# Patient Record
Sex: FEMALE | Race: WHITE | NOT HISPANIC OR LATINO | Employment: OTHER | URBAN - METROPOLITAN AREA
[De-identification: names, ages, dates, MRNs, and addresses within clinical notes are randomized per-mention and may not be internally consistent; named-entity substitution may affect disease eponyms.]

---

## 2023-09-13 LAB — BMD RECOMMENDATION EXT: NORMAL

## 2024-01-18 ENCOUNTER — HOSPITAL ENCOUNTER (EMERGENCY)
Facility: HOSPITAL | Age: 79
Discharge: HOME | End: 2024-01-19
Attending: EMERGENCY MEDICINE
Payer: MEDICARE

## 2024-01-18 VITALS
OXYGEN SATURATION: 95 % | WEIGHT: 130 LBS | RESPIRATION RATE: 18 BRPM | BODY MASS INDEX: 22.2 KG/M2 | SYSTOLIC BLOOD PRESSURE: 158 MMHG | HEIGHT: 64 IN | DIASTOLIC BLOOD PRESSURE: 94 MMHG | HEART RATE: 86 BPM | TEMPERATURE: 98 F

## 2024-01-18 DIAGNOSIS — T78.40XA ALLERGIC REACTION, INITIAL ENCOUNTER: Primary | ICD-10-CM

## 2024-01-18 LAB
ALBUMIN SERPL BCP-MCNC: 3.2 G/DL (ref 3.5–5.2)
ALP SERPL-CCNC: 77 U/L (ref 55–135)
ALT SERPL W/O P-5'-P-CCNC: 7 U/L (ref 10–44)
ANION GAP SERPL CALC-SCNC: 13 MMOL/L (ref 8–16)
AST SERPL-CCNC: 14 U/L (ref 10–40)
BASOPHILS # BLD AUTO: 0.01 K/UL (ref 0–0.2)
BASOPHILS NFR BLD: 0.1 % (ref 0–1.9)
BILIRUB SERPL-MCNC: 0.2 MG/DL (ref 0.1–1)
BNP SERPL-MCNC: 76 PG/ML (ref 0–99)
BUN SERPL-MCNC: 20 MG/DL (ref 8–23)
CALCIUM SERPL-MCNC: 9.9 MG/DL (ref 8.7–10.5)
CHLORIDE SERPL-SCNC: 101 MMOL/L (ref 95–110)
CO2 SERPL-SCNC: 24 MMOL/L (ref 23–29)
CREAT SERPL-MCNC: 0.8 MG/DL (ref 0.5–1.4)
DIFFERENTIAL METHOD BLD: ABNORMAL
EOSINOPHIL # BLD AUTO: 0.2 K/UL (ref 0–0.5)
EOSINOPHIL NFR BLD: 2.2 % (ref 0–8)
ERYTHROCYTE [DISTWIDTH] IN BLOOD BY AUTOMATED COUNT: 15.2 % (ref 11.5–14.5)
EST. GFR  (NO RACE VARIABLE): >60 ML/MIN/1.73 M^2
GLUCOSE SERPL-MCNC: 105 MG/DL (ref 70–110)
HCT VFR BLD AUTO: 38.4 % (ref 37–48.5)
HGB BLD-MCNC: 12 G/DL (ref 12–16)
IMM GRANULOCYTES # BLD AUTO: 0.03 K/UL (ref 0–0.04)
IMM GRANULOCYTES NFR BLD AUTO: 0.4 % (ref 0–0.5)
INFLUENZA A, MOLECULAR: NEGATIVE
INFLUENZA B, MOLECULAR: NEGATIVE
LYMPHOCYTES # BLD AUTO: 1 K/UL (ref 1–4.8)
LYMPHOCYTES NFR BLD: 14.6 % (ref 18–48)
MAGNESIUM SERPL-MCNC: 2.1 MG/DL (ref 1.6–2.6)
MCH RBC QN AUTO: 28.1 PG (ref 27–31)
MCHC RBC AUTO-ENTMCNC: 31.3 G/DL (ref 32–36)
MCV RBC AUTO: 90 FL (ref 82–98)
MONOCYTES # BLD AUTO: 0.4 K/UL (ref 0.3–1)
MONOCYTES NFR BLD: 6.6 % (ref 4–15)
NEUTROPHILS # BLD AUTO: 5.1 K/UL (ref 1.8–7.7)
NEUTROPHILS NFR BLD: 76.1 % (ref 38–73)
NRBC BLD-RTO: 0 /100 WBC
PLATELET # BLD AUTO: 430 K/UL (ref 150–450)
PMV BLD AUTO: 9.9 FL (ref 9.2–12.9)
POTASSIUM SERPL-SCNC: 4.1 MMOL/L (ref 3.5–5.1)
PROT SERPL-MCNC: 8.2 G/DL (ref 6–8.4)
RBC # BLD AUTO: 4.27 M/UL (ref 4–5.4)
SARS-COV-2 RDRP RESP QL NAA+PROBE: NEGATIVE
SODIUM SERPL-SCNC: 138 MMOL/L (ref 136–145)
SPECIMEN SOURCE: NORMAL
WBC # BLD AUTO: 6.7 K/UL (ref 3.9–12.7)

## 2024-01-18 PROCEDURE — 71045 X-RAY EXAM CHEST 1 VIEW: CPT | Mod: TC

## 2024-01-18 PROCEDURE — 25000242 PHARM REV CODE 250 ALT 637 W/ HCPCS: Performed by: EMERGENCY MEDICINE

## 2024-01-18 PROCEDURE — 27000221 HC OXYGEN, UP TO 24 HOURS

## 2024-01-18 PROCEDURE — 94640 AIRWAY INHALATION TREATMENT: CPT

## 2024-01-18 PROCEDURE — 25000003 PHARM REV CODE 250: Performed by: EMERGENCY MEDICINE

## 2024-01-18 PROCEDURE — 63600175 PHARM REV CODE 636 W HCPCS: Performed by: EMERGENCY MEDICINE

## 2024-01-18 PROCEDURE — 83880 ASSAY OF NATRIURETIC PEPTIDE: CPT | Performed by: EMERGENCY MEDICINE

## 2024-01-18 PROCEDURE — 87502 INFLUENZA DNA AMP PROBE: CPT | Performed by: EMERGENCY MEDICINE

## 2024-01-18 PROCEDURE — 80053 COMPREHEN METABOLIC PANEL: CPT | Performed by: EMERGENCY MEDICINE

## 2024-01-18 PROCEDURE — 94761 N-INVAS EAR/PLS OXIMETRY MLT: CPT

## 2024-01-18 PROCEDURE — 83735 ASSAY OF MAGNESIUM: CPT | Performed by: EMERGENCY MEDICINE

## 2024-01-18 PROCEDURE — 71045 X-RAY EXAM CHEST 1 VIEW: CPT | Mod: 26,,, | Performed by: RADIOLOGY

## 2024-01-18 PROCEDURE — 96374 THER/PROPH/DIAG INJ IV PUSH: CPT

## 2024-01-18 PROCEDURE — U0002 COVID-19 LAB TEST NON-CDC: HCPCS | Performed by: EMERGENCY MEDICINE

## 2024-01-18 PROCEDURE — 96375 TX/PRO/DX INJ NEW DRUG ADDON: CPT

## 2024-01-18 PROCEDURE — 99284 EMERGENCY DEPT VISIT MOD MDM: CPT | Mod: 25

## 2024-01-18 PROCEDURE — 85025 COMPLETE CBC W/AUTO DIFF WBC: CPT | Performed by: EMERGENCY MEDICINE

## 2024-01-18 RX ORDER — IPRATROPIUM BROMIDE AND ALBUTEROL SULFATE 2.5; .5 MG/3ML; MG/3ML
3 SOLUTION RESPIRATORY (INHALATION)
Status: COMPLETED | OUTPATIENT
Start: 2024-01-18 | End: 2024-01-18

## 2024-01-18 RX ORDER — PREDNISONE 20 MG/1
60 TABLET ORAL DAILY
Qty: 15 TABLET | Refills: 0 | Status: SHIPPED | OUTPATIENT
Start: 2024-01-18 | End: 2024-01-23

## 2024-01-18 RX ORDER — KETOROLAC TROMETHAMINE 30 MG/ML
15 INJECTION, SOLUTION INTRAMUSCULAR; INTRAVENOUS
Status: COMPLETED | OUTPATIENT
Start: 2024-01-18 | End: 2024-01-18

## 2024-01-18 RX ORDER — TRAMADOL HYDROCHLORIDE 50 MG/1
50 TABLET ORAL
Status: COMPLETED | OUTPATIENT
Start: 2024-01-18 | End: 2024-01-18

## 2024-01-18 RX ORDER — ALBUTEROL SULFATE 90 UG/1
1-2 AEROSOL, METERED RESPIRATORY (INHALATION) EVERY 4 HOURS PRN
Qty: 8 G | Refills: 0 | Status: SHIPPED | OUTPATIENT
Start: 2024-01-18 | End: 2024-02-25

## 2024-01-18 RX ORDER — DEXAMETHASONE SODIUM PHOSPHATE 10 MG/ML
10 INJECTION INTRAMUSCULAR; INTRAVENOUS
Status: COMPLETED | OUTPATIENT
Start: 2024-01-18 | End: 2024-01-18

## 2024-01-18 RX ADMIN — IPRATROPIUM BROMIDE AND ALBUTEROL SULFATE 3 ML: .5; 2.5 SOLUTION RESPIRATORY (INHALATION) at 08:01

## 2024-01-18 RX ADMIN — KETOROLAC TROMETHAMINE 15 MG: 30 INJECTION, SOLUTION INTRAMUSCULAR; INTRAVENOUS at 09:01

## 2024-01-18 RX ADMIN — DEXAMETHASONE SODIUM PHOSPHATE 10 MG: 10 INJECTION INTRAMUSCULAR; INTRAVENOUS at 07:01

## 2024-01-18 RX ADMIN — TRAMADOL HYDROCHLORIDE 50 MG: 50 TABLET, COATED ORAL at 09:01

## 2024-01-19 NOTE — ED NOTES
Pt noted to have redness to grace upper and lower extremities, abd, and upper back areas. Pt reports itching and redness.    VTAMA Counseling: I discussed with the patient that VTAMA is not for use in the eyes, mouth or mouth. They should call the office if they develop any signs of allergic reactions to VTAMA. The patient verbalized understanding of the proper use and possible adverse effects of VTAMA.  All of the patient's questions and concerns were addressed.

## 2024-01-19 NOTE — ED PROVIDER NOTES
Encounter Date: 1/18/2024       History     Chief Complaint   Patient presents with    Allergic Reaction    Itching     EMS reports pt was complaining of itching and redness to skin. EMS reports that pt had taken a Benadryl prior to their arrival. Pt refused IV access for first responders and for EMS. Pt denies taking any new meds. Pt reports itching and redness to underarms also.      Pt with hx of RA here via EMS for eval itchy rash onset today. No new meds or known exposures. No SOB but EMS says pt's RA sats in the 80s. 95% on 2L. Pt denies hx of COPD or CHF. She has not had any recent illness nor taken any abx the past few weeks. She took benadryl pta and her itching has resolved. No pain. She says her BP was elevated when EMS checked it. She denies hx of HTN. She says she had RSV and shingles vaccines 2days ago.     The history is provided by the patient.   Allergic Reaction  The primary symptoms are  rash and urticaria. The primary symptoms do not include wheezing, shortness of breath, cough, abdominal pain, nausea, vomiting, diarrhea, dizziness, palpitations, altered mental status or angioedema. The current episode started today. The problem has not changed since onset.  The rash began today. The rash appears on the right arm, left arm, back, torso, face, groin and head. The rash is associated with itching. The rash is not associated with blisters or weeping.   Significant symptoms also include itching. Significant symptoms that are not present include eye redness, flushing or rhinorrhea.     Review of patient's allergies indicates:   Allergen Reactions    Pork/porcine containing products Hives    Beef containing products Rash     Past Medical History:   Diagnosis Date    Arthritis     Restless legs     Shingles      No past surgical history on file.  History reviewed. No pertinent family history.     Review of Systems   HENT:  Negative for rhinorrhea.    Eyes:  Negative for redness.   Respiratory:  Negative  for cough, shortness of breath and wheezing.    Cardiovascular:  Negative for palpitations.   Gastrointestinal:  Negative for abdominal pain, diarrhea, nausea and vomiting.   Skin:  Positive for itching and rash. Negative for flushing.   Neurological:  Negative for dizziness.   All other systems reviewed and are negative.      Physical Exam     Initial Vitals [01/18/24 1846]   BP Pulse Resp Temp SpO2   (!) 176/92 73 18 97.6 °F (36.4 °C) (!) 89 %      MAP       --         Physical Exam    Nursing note and vitals reviewed.  Constitutional: She appears well-developed and well-nourished. She is not diaphoretic. No distress.   HENT:   Head: Normocephalic and atraumatic.   Mouth/Throat: Oropharynx is clear and moist.   No mucosal lesions   Eyes: Conjunctivae and EOM are normal. Right eye exhibits no discharge. Left eye exhibits no discharge. No scleral icterus.   Neck: Neck supple. No thyromegaly present. No JVD present.   Normal range of motion.  Cardiovascular:  Normal rate, regular rhythm, normal heart sounds and intact distal pulses.           Pulmonary/Chest: She exhibits no tenderness.   Diminished throughout but clear   Abdominal: Abdomen is soft. There is no abdominal tenderness.   Musculoskeletal:         General: No tenderness or edema. Normal range of motion.      Cervical back: Normal range of motion and neck supple.      Comments: RA deformed fingers     Neurological: She is alert and oriented to person, place, and time. GCS score is 15. GCS eye subscore is 4. GCS verbal subscore is 5. GCS motor subscore is 6.   Skin: Skin is warm and dry. Capillary refill takes less than 2 seconds. Rash noted. No abscess noted. There is erythema. No pallor.   Urticarial rash to dorsum of hands, volar forearms, grace axillae, back, chest and groin. Spares palms and soles.    Psychiatric: She has a normal mood and affect.         ED Course   Procedures  Labs Reviewed   CBC W/ AUTO DIFFERENTIAL - Abnormal; Notable for the  following components:       Result Value    MCHC 31.3 (*)     RDW 15.2 (*)     Gran % 76.1 (*)     Lymph % 14.6 (*)     All other components within normal limits   COMPREHENSIVE METABOLIC PANEL - Abnormal; Notable for the following components:    Albumin 3.2 (*)     ALT 7 (*)     All other components within normal limits   INFLUENZA A & B BY MOLECULAR   MAGNESIUM   SARS-COV-2 RNA AMPLIFICATION, QUAL    Narrative:     Is the patient symptomatic?->No   B-TYPE NATRIURETIC PEPTIDE          Imaging Results              X-Ray Chest AP Portable (Final result)  Result time 01/18/24 19:23:25      Final result by Gaston Gonzalez MD (01/18/24 19:23:25)                   Impression:      No acute cardiopulmonary abnormality appreciated radiographically.      Electronically signed by: Med Gonzalez MD  Date:    01/18/2024  Time:    19:23               Narrative:    EXAMINATION:  XR CHEST AP PORTABLE    CLINICAL HISTORY:  hypoxia;    TECHNIQUE:  A single portable upright AP chest radiograph was acquired.    COMPARISON:  None    FINDINGS:  The cardiac silhouette is not enlarged.  The chest is partially obscured by bilateral breast implants.  There are probable chronic interstitial lung changes noted within the mid and lower lung zones.  No airspace consolidation or definite pulmonary mass appreciated on the provided images.  No significant volume of pleural fluid.  No pneumothorax.  The bones are osteopenic.  There is degenerative change of the AC joints.  There is degenerative change of the thoracic spine.                                       Medications   dexAMETHasone sodium phos (PF) injection 10 mg (10 mg Intravenous Given 1/18/24 1926)   albuterol-ipratropium 2.5 mg-0.5 mg/3 mL nebulizer solution 3 mL (3 mLs Nebulization Given 1/18/24 2028)   ketorolac injection 15 mg (15 mg Intravenous Given 1/18/24 2112)   traMADoL tablet 50 mg (50 mg Oral Given 1/18/24 2112)     Medical Decision Making  Pt presented with urticarial  rash of unknown etiology. She also had transient hypoxia but clear lungs on exam. She was given decadron and a neb. Her rash improved and her O2 requirement resolved. Pt was never sob. CBC, CMP,  Mg, BNP unremarkable. Covid and flu neg. Pt advised to make appt with her doctor in the next few days. Return precautions discussed.     Amount and/or Complexity of Data Reviewed  Labs: ordered. Decision-making details documented in ED Course.  Radiology: ordered. Decision-making details documented in ED Course.    Risk  Prescription drug management.               ED Course as of 01/18/24 2118   Thu Jan 18, 2024   2100 Rash improved. No longer requiring O2.  [DC]      ED Course User Index  [DC] Tomy Monae Jr., MD                           Clinical Impression:  Final diagnoses:  [T78.40XA] Allergic reaction, initial encounter (Primary)          ED Disposition Condition    Discharge Stable          ED Prescriptions       Medication Sig Dispense Start Date End Date Auth. Provider    predniSONE (DELTASONE) 20 MG tablet Take 3 tablets (60 mg total) by mouth once daily. for 5 days 15 tablet 1/18/2024 1/23/2024 Tomy Monae Jr., MD    albuterol (PROVENTIL/VENTOLIN HFA) 90 mcg/actuation inhaler Inhale 1-2 puffs into the lungs every 4 (four) hours as needed for Wheezing or Shortness of Breath. Rescue 8 g 1/18/2024 1/17/2025 Tomy Monae Jr., MD          Follow-up Information       Follow up With Specialties Details Why Contact Info    primary care clinic  Schedule an appointment as soon as possible for a visit                Tomy Monae Jr., MD  01/18/24 2118

## 2024-01-25 ENCOUNTER — OFFICE VISIT (OUTPATIENT)
Dept: FAMILY MEDICINE | Facility: CLINIC | Age: 79
End: 2024-01-25
Payer: MEDICARE

## 2024-01-25 ENCOUNTER — LAB VISIT (OUTPATIENT)
Dept: LAB | Facility: HOSPITAL | Age: 79
End: 2024-01-25
Attending: NURSE PRACTITIONER
Payer: MEDICARE

## 2024-01-25 VITALS
HEART RATE: 70 BPM | BODY MASS INDEX: 22.47 KG/M2 | OXYGEN SATURATION: 94 % | SYSTOLIC BLOOD PRESSURE: 140 MMHG | WEIGHT: 130.88 LBS | DIASTOLIC BLOOD PRESSURE: 80 MMHG

## 2024-01-25 DIAGNOSIS — Z00.00 ENCOUNTER FOR MEDICAL EXAMINATION TO ESTABLISH CARE: Primary | ICD-10-CM

## 2024-01-25 DIAGNOSIS — S40.212A ABRASION OF LEFT SHOULDER, INITIAL ENCOUNTER: ICD-10-CM

## 2024-01-25 DIAGNOSIS — Z13.220 ENCOUNTER FOR LIPID SCREENING FOR CARDIOVASCULAR DISEASE: ICD-10-CM

## 2024-01-25 DIAGNOSIS — Z13.6 ENCOUNTER FOR LIPID SCREENING FOR CARDIOVASCULAR DISEASE: ICD-10-CM

## 2024-01-25 DIAGNOSIS — F41.8 SITUATIONAL ANXIETY: ICD-10-CM

## 2024-01-25 DIAGNOSIS — E03.9 HYPOTHYROIDISM, UNSPECIFIED TYPE: ICD-10-CM

## 2024-01-25 DIAGNOSIS — Z86.2 HISTORY OF ANEMIA: ICD-10-CM

## 2024-01-25 DIAGNOSIS — Z12.11 SCREENING FOR COLON CANCER: ICD-10-CM

## 2024-01-25 DIAGNOSIS — R03.0 ELEVATED BLOOD PRESSURE READING IN OFFICE WITHOUT DIAGNOSIS OF HYPERTENSION: ICD-10-CM
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
## 2024-01-25 DIAGNOSIS — J84.9 CHRONIC INTERSTITIAL LUNG DISEASE: ICD-10-CM

## 2024-01-25 DIAGNOSIS — L50.0 URTICARIA DUE TO FOOD ALLERGY: ICD-10-CM

## 2024-01-25 DIAGNOSIS — Z23 NEED FOR VACCINATION: ICD-10-CM

## 2024-01-25 LAB
CHOLEST SERPL-MCNC: 172 MG/DL (ref 120–199)
CHOLEST/HDLC SERPL: 3.4 {RATIO} (ref 2–5)
HDLC SERPL-MCNC: 50 MG/DL (ref 40–75)
HDLC SERPL: 29.1 % (ref 20–50)
IRON SERPL-MCNC: 39 UG/DL (ref 30–160)
LDLC SERPL CALC-MCNC: 78.8 MG/DL (ref 63–159)
NONHDLC SERPL-MCNC: 122 MG/DL
SATURATED IRON: 9 % (ref 20–50)
T4 FREE SERPL-MCNC: 0.85 NG/DL (ref 0.71–1.51)
TOTAL IRON BINDING CAPACITY: 450 UG/DL (ref 250–450)
TRANSFERRIN SERPL-MCNC: 304 MG/DL (ref 200–375)
TRIGL SERPL-MCNC: 216 MG/DL (ref 30–150)
TSH SERPL DL<=0.005 MIU/L-ACNC: 4.82 UIU/ML (ref 0.4–4)

## 2024-01-25 PROCEDURE — 1160F RVW MEDS BY RX/DR IN RCRD: CPT | Mod: CPTII,S$GLB,, | Performed by: NURSE PRACTITIONER

## 2024-01-25 PROCEDURE — 83540 ASSAY OF IRON: CPT | Performed by: NURSE PRACTITIONER

## 2024-01-25 PROCEDURE — 80061 LIPID PANEL: CPT | Performed by: NURSE PRACTITIONER

## 2024-01-25 PROCEDURE — 84481 FREE ASSAY (FT-3): CPT | Performed by: NURSE PRACTITIONER

## 2024-01-25 PROCEDURE — 1101F PT FALLS ASSESS-DOCD LE1/YR: CPT | Mod: CPTII,S$GLB,, | Performed by: NURSE PRACTITIONER

## 2024-01-25 PROCEDURE — 1159F MED LIST DOCD IN RCRD: CPT | Mod: CPTII,S$GLB,, | Performed by: NURSE PRACTITIONER

## 2024-01-25 PROCEDURE — 90714 TD VACC NO PRESV 7 YRS+ IM: CPT | Mod: S$GLB,,, | Performed by: NURSE PRACTITIONER

## 2024-01-25 PROCEDURE — 36415 COLL VENOUS BLD VENIPUNCTURE: CPT | Performed by: NURSE PRACTITIONER

## 2024-01-25 PROCEDURE — 99204 OFFICE O/P NEW MOD 45 MIN: CPT | Mod: 25,S$GLB,, | Performed by: NURSE PRACTITIONER

## 2024-01-25 PROCEDURE — 1126F AMNT PAIN NOTED NONE PRSNT: CPT | Mod: CPTII,S$GLB,, | Performed by: NURSE PRACTITIONER

## 2024-01-25 PROCEDURE — 99999 PR PBB SHADOW E&M-EST. PATIENT-LVL III: CPT | Mod: PBBFAC,,, | Performed by: NURSE PRACTITIONER

## 2024-01-25 PROCEDURE — 3288F FALL RISK ASSESSMENT DOCD: CPT | Mod: CPTII,S$GLB,, | Performed by: NURSE PRACTITIONER

## 2024-01-25 PROCEDURE — 82728 ASSAY OF FERRITIN: CPT | Performed by: NURSE PRACTITIONER

## 2024-01-25 PROCEDURE — 84439 ASSAY OF FREE THYROXINE: CPT | Performed by: NURSE PRACTITIONER

## 2024-01-25 PROCEDURE — 3079F DIAST BP 80-89 MM HG: CPT | Mod: CPTII,S$GLB,, | Performed by: NURSE PRACTITIONER

## 2024-01-25 PROCEDURE — 3077F SYST BP >= 140 MM HG: CPT | Mod: CPTII,S$GLB,, | Performed by: NURSE PRACTITIONER

## 2024-01-25 PROCEDURE — 90471 IMMUNIZATION ADMIN: CPT | Mod: S$GLB,,, | Performed by: NURSE PRACTITIONER

## 2024-01-25 PROCEDURE — 84443 ASSAY THYROID STIM HORMONE: CPT | Performed by: NURSE PRACTITIONER

## 2024-01-25 RX ORDER — EPINEPHRINE 0.3 MG/.3ML
INJECTION SUBCUTANEOUS
COMMUNITY

## 2024-01-25 RX ORDER — ROPINIROLE 0.5 MG/1
0.5 TABLET, FILM COATED ORAL
COMMUNITY
End: 2024-03-11 | Stop reason: SDUPTHER

## 2024-01-25 RX ORDER — ALENDRONATE SODIUM 70 MG/1
70 TABLET ORAL
COMMUNITY
End: 2024-03-11 | Stop reason: SDUPTHER

## 2024-01-25 RX ORDER — LEVOTHYROXINE SODIUM 75 UG/1
75 TABLET ORAL
COMMUNITY
End: 2024-03-11 | Stop reason: SDUPTHER

## 2024-01-25 RX ORDER — EPINEPHRINE 0.3 MG/.3ML
1 INJECTION SUBCUTANEOUS ONCE
Qty: 0.3 ML | Refills: 0 | Status: SHIPPED | OUTPATIENT
Start: 2024-01-25 | End: 2024-01-25

## 2024-01-25 RX ORDER — ROSUVASTATIN CALCIUM 10 MG/1
10 TABLET, COATED ORAL DAILY
COMMUNITY
End: 2024-03-11 | Stop reason: SDUPTHER

## 2024-01-25 RX ORDER — AMOXICILLIN AND CLAVULANATE POTASSIUM 875; 125 MG/1; MG/1
1 TABLET, FILM COATED ORAL 2 TIMES DAILY
Qty: 10 TABLET | Refills: 0 | Status: SHIPPED | OUTPATIENT
Start: 2024-01-25 | End: 2024-01-30

## 2024-01-25 RX ORDER — MUPIROCIN 20 MG/G
OINTMENT TOPICAL 3 TIMES DAILY
Qty: 22 G | Refills: 0 | Status: SHIPPED | OUTPATIENT
Start: 2024-01-25

## 2024-01-25 RX ORDER — IBUPROFEN 800 MG/1
800 TABLET ORAL EVERY 8 HOURS
COMMUNITY
End: 2024-05-21 | Stop reason: SDUPTHER

## 2024-01-25 RX ORDER — PHENYTOIN SODIUM 100 MG/1
100 CAPSULE, EXTENDED RELEASE ORAL
COMMUNITY

## 2024-01-25 RX ORDER — HYDROXYZINE HYDROCHLORIDE 10 MG/1
10 TABLET, FILM COATED ORAL 3 TIMES DAILY PRN
Qty: 60 TABLET | Refills: 1 | Status: SHIPPED | OUTPATIENT
Start: 2024-01-25

## 2024-01-26 DIAGNOSIS — E03.8 SUBCLINICAL HYPOTHYROIDISM: Primary | ICD-10-CM

## 2024-01-26 DIAGNOSIS — D64.9 ANEMIA, UNSPECIFIED TYPE: ICD-10-CM

## 2024-01-26 LAB
FERRITIN SERPL-MCNC: 29 NG/ML (ref 20–300)
T3FREE SERPL-MCNC: 2.1 PG/ML (ref 2.3–4.2)

## 2024-01-26 RX ORDER — FERROUS SULFATE 325(65) MG
325 TABLET ORAL
Qty: 30 TABLET | Refills: 5 | Status: SHIPPED | OUTPATIENT
Start: 2024-01-26 | End: 2024-03-11 | Stop reason: SDUPTHER

## 2024-01-26 NOTE — PROGRESS NOTES
I have reviewed the notes, assessments, and/or procedures performed by Garima Gonzalez NP, I concur with her documentation of Anitra Fermin

## 2024-02-25 ENCOUNTER — NURSE TRIAGE (OUTPATIENT)
Dept: ADMINISTRATIVE | Facility: CLINIC | Age: 79
End: 2024-02-25
Payer: MEDICARE

## 2024-02-25 ENCOUNTER — HOSPITAL ENCOUNTER (EMERGENCY)
Facility: HOSPITAL | Age: 79
Discharge: HOME | End: 2024-02-25
Attending: EMERGENCY MEDICINE
Payer: MEDICARE

## 2024-02-25 VITALS
SYSTOLIC BLOOD PRESSURE: 197 MMHG | TEMPERATURE: 99 F | RESPIRATION RATE: 18 BRPM | OXYGEN SATURATION: 96 % | WEIGHT: 130 LBS | HEART RATE: 90 BPM | DIASTOLIC BLOOD PRESSURE: 84 MMHG | BODY MASS INDEX: 22.31 KG/M2

## 2024-02-25 DIAGNOSIS — T78.40XA ALLERGIC REACTION, INITIAL ENCOUNTER: ICD-10-CM

## 2024-02-25 DIAGNOSIS — J40 BRONCHITIS: Primary | ICD-10-CM

## 2024-02-25 LAB
GROUP A STREP, MOLECULAR: NEGATIVE
INFLUENZA A, MOLECULAR: NEGATIVE
INFLUENZA B, MOLECULAR: NEGATIVE
SARS-COV-2 RDRP RESP QL NAA+PROBE: NEGATIVE
SPECIMEN SOURCE: NORMAL

## 2024-02-25 PROCEDURE — U0002 COVID-19 LAB TEST NON-CDC: HCPCS | Performed by: NURSE PRACTITIONER

## 2024-02-25 PROCEDURE — 87651 STREP A DNA AMP PROBE: CPT | Performed by: NURSE PRACTITIONER

## 2024-02-25 PROCEDURE — 63600175 PHARM REV CODE 636 W HCPCS: Performed by: EMERGENCY MEDICINE

## 2024-02-25 PROCEDURE — 87502 INFLUENZA DNA AMP PROBE: CPT | Performed by: NURSE PRACTITIONER

## 2024-02-25 PROCEDURE — 99284 EMERGENCY DEPT VISIT MOD MDM: CPT

## 2024-02-25 RX ORDER — ALBUTEROL SULFATE 90 UG/1
1-2 AEROSOL, METERED RESPIRATORY (INHALATION) EVERY 4 HOURS PRN
Qty: 8 G | Refills: 0 | Status: SHIPPED | OUTPATIENT
Start: 2024-02-25 | End: 2025-02-24

## 2024-02-25 RX ORDER — AZITHROMYCIN 250 MG/1
TABLET, FILM COATED ORAL
Qty: 6 TABLET | Refills: 0 | Status: SHIPPED | OUTPATIENT
Start: 2024-02-25 | End: 2024-03-08 | Stop reason: ALTCHOICE

## 2024-02-25 RX ORDER — PREDNISONE 20 MG/1
20 TABLET ORAL DAILY
Qty: 5 TABLET | Refills: 0 | Status: SHIPPED | OUTPATIENT
Start: 2024-02-25 | End: 2024-03-08 | Stop reason: ALTCHOICE

## 2024-02-25 RX ORDER — PREDNISONE 10 MG/1
40 TABLET ORAL
Status: COMPLETED | OUTPATIENT
Start: 2024-02-25 | End: 2024-02-25

## 2024-02-25 RX ADMIN — PREDNISONE 40 MG: 10 TABLET ORAL at 10:02

## 2024-02-26 NOTE — ED PROVIDER NOTES
Encounter Date: 2024       History     Chief Complaint   Patient presents with    Sore Throat     Began today. Was unable to eat dinner because it hurts too bad     POV to ED alone.  Patient complains of cough with congestion, body aches, chills, and sore throat onset earlier today.  She denies fever or vomiting.  Unknown sick contacts.  No other complaints    The history is provided by the patient.     Review of patient's allergies indicates:   Allergen Reactions    Pork/porcine containing products Hives    Sheep/ovine/lamb containing products     Beef containing products Rash     Past Medical History:   Diagnosis Date    Arthritis     Restless legs     Shingles      History reviewed. No pertinent surgical history.  History reviewed. No pertinent family history.  Social History     Tobacco Use    Smoking status: Former     Current packs/day: 0.00     Types: Cigarettes     Quit date: 1/15/1996     Years since quittin.1    Smokeless tobacco: Never     Review of Systems   Constitutional:  Positive for chills and fatigue. Negative for fever.   HENT:  Positive for congestion, rhinorrhea and sore throat.    Respiratory:  Positive for cough. Negative for shortness of breath.    Gastrointestinal:  Negative for abdominal pain, diarrhea, nausea and vomiting.   All other systems reviewed and are negative.      Physical Exam     Initial Vitals [24]   BP Pulse Resp Temp SpO2   (!) 197/84 90 18 98.7 °F (37.1 °C) 96 %      MAP       --         Physical Exam    Nursing note and vitals reviewed.  Constitutional: She appears well-developed and well-nourished. No distress.   HENT:   Head: Normocephalic and atraumatic.   Right Ear: External ear normal.   Left Ear: External ear normal.   Mild erythema, postnasal drainage.  No swelling, no exudate   Eyes: Pupils are equal, round, and reactive to light.   Neck:   Normal range of motion.  Cardiovascular:  Normal rate and regular rhythm.           Pulmonary/Chest:  Breath sounds normal. No respiratory distress.   Abdominal: Abdomen is soft. There is no abdominal tenderness.   Musculoskeletal:         General: Normal range of motion.      Cervical back: Normal range of motion.     Neurological: She is alert and oriented to person, place, and time. She has normal strength. GCS score is 15. GCS eye subscore is 4. GCS verbal subscore is 5. GCS motor subscore is 6.   Skin: Skin is warm and dry. Capillary refill takes 2 to 3 seconds.   Psychiatric: She has a normal mood and affect. Thought content normal.         ED Course   Procedures  Labs Reviewed   GROUP A STREP, MOLECULAR   INFLUENZA A & B BY MOLECULAR   SARS-COV-2 RNA AMPLIFICATION, QUAL          Imaging Results    None          Medications - No data to display  Medical Decision Making  Presents for evaluation of sore throat with upper respiratory illness, lab work ordered.  Disposition pending  Differentials including but not limited to viral illness, bacterial illness, otitis, sinusitis, bronchitis, pneumonia  @ 2153 end of shift report to Dr Martinez                                      Clinical Impression:                 Ondina Gallo, DON  02/25/24 2133       Ondina Gallo, DON  02/25/24 2154

## 2024-02-26 NOTE — TELEPHONE ENCOUNTER
"[place] Pt:    Pt calls stating that both sides of her throat are infected "up into my ear." Pt states that it hurts to swallow. Symptoms started today. Pt notes some difficulty breathing. She states that she used to get similar symptoms "when I lived in [place] and they would give me a z-pack."     Care Advice recommends that pt be seen in ED now. Pt verbalizes understanding and is instructed to call back with any new/worsening sxs, questions, or concerns.   Reason for Disposition   [1] Difficulty breathing AND [2] not severe    Additional Information   Negative: SEVERE difficulty breathing (e.g., struggling for each breath, speaks in single words, stridor)   Negative: Sounds like a life-threatening emergency to the triager   Negative: [1] Drooling or spitting out saliva (because can't swallow) AND [2] normal breathing   Negative: Unable to open mouth completely    Protocols used: Sore Throat-A-AH    "

## 2024-02-26 NOTE — DISCHARGE INSTRUCTIONS
You likely have an upper respiratory infection.  Though your swabs were negative today sometimes it takes over 24 hours for those swabs to when tested become back positive.  So if you still feel unwell in the next 24-48 hours please seek repeat medical evaluation and they may need to be swab you for things like COVID or flu to recheck you.  Return to the emergency department with any new or worsening symptoms.

## 2024-02-28 DIAGNOSIS — Z78.0 MENOPAUSE: ICD-10-CM

## 2024-03-01 ENCOUNTER — TELEPHONE (OUTPATIENT)
Dept: FAMILY MEDICINE | Facility: CLINIC | Age: 79
End: 2024-03-01
Payer: MEDICARE

## 2024-03-01 NOTE — TELEPHONE ENCOUNTER
Spoke with patient she states that that she was seen in the ED and she is requesting a Z-DEQUAN. She has no relief of symptoms.    Seen in ED on 2/25/24 Prescribed Z-DEQUAN on 2/25/24 prescription end date is today.    E-visit offered she states she does not use the portal.

## 2024-03-01 NOTE — TELEPHONE ENCOUNTER
----- Message from Phyllis Casey sent at 3/1/2024 10:31 AM CST -----  Contact: Self  Type:  RX Refill Request    Who Called:  Self  Refill or New Rx:  refill  RX Name and Strength:  azithromycin (Z-DEQUAN) 250 MG table - she needs more after her hosp visit  How is the patient currently taking it? (ex. 1XDay):  Take 2 tablets by mouth on day 1; Take 1 tablet by mouth on days 2-5   Is this a 30 day or 90 day RX:  6 tablet 0   Preferred Pharmacy with phone number:    [facility] [address] [place] [address] [address] [address]  Phone: [phone] Fax: [phone]  Local or Mail Order:  Local  Ordering Provider:  Carlos De La Rosa Call Back Number:  [phone]  Additional Information:  Please call the patient back at the phone number listed above to advise. Thank you!

## 2024-03-04 ENCOUNTER — PATIENT MESSAGE (OUTPATIENT)
Dept: FAMILY MEDICINE | Facility: CLINIC | Age: 79
End: 2024-03-04
Payer: MEDICARE

## 2024-03-08 ENCOUNTER — OFFICE VISIT (OUTPATIENT)
Dept: FAMILY MEDICINE | Facility: CLINIC | Age: 79
End: 2024-03-08
Payer: MEDICARE

## 2024-03-08 VITALS
WEIGHT: 137.19 LBS | SYSTOLIC BLOOD PRESSURE: 128 MMHG | OXYGEN SATURATION: 97 % | DIASTOLIC BLOOD PRESSURE: 80 MMHG | HEART RATE: 67 BPM | BODY MASS INDEX: 23.55 KG/M2

## 2024-03-08 DIAGNOSIS — E03.9 HYPOTHYROIDISM, UNSPECIFIED TYPE: ICD-10-CM

## 2024-03-08 DIAGNOSIS — J84.9 CHRONIC INTERSTITIAL LUNG DISEASE: Primary | ICD-10-CM

## 2024-03-08 DIAGNOSIS — D64.9 ANEMIA, UNSPECIFIED TYPE: ICD-10-CM

## 2024-03-08 DIAGNOSIS — M81.0 AGE-RELATED OSTEOPOROSIS WITHOUT CURRENT PATHOLOGICAL FRACTURE: ICD-10-CM

## 2024-03-08 DIAGNOSIS — Z87.39 PERSONAL HISTORY OF RHEUMATOID ARTHRITIS: ICD-10-CM

## 2024-03-08 DIAGNOSIS — G25.81 RESTLESS LEGS SYNDROME: ICD-10-CM

## 2024-03-08 DIAGNOSIS — E78.2 MIXED HYPERLIPIDEMIA: ICD-10-CM

## 2024-03-08 DIAGNOSIS — F41.8 SITUATIONAL ANXIETY: ICD-10-CM

## 2024-03-08 PROCEDURE — 1160F RVW MEDS BY RX/DR IN RCRD: CPT | Mod: CPTII,S$GLB,, | Performed by: NURSE PRACTITIONER

## 2024-03-08 PROCEDURE — 99214 OFFICE O/P EST MOD 30 MIN: CPT | Mod: S$GLB,,, | Performed by: NURSE PRACTITIONER

## 2024-03-08 PROCEDURE — 99999 PR PBB SHADOW E&M-EST. PATIENT-LVL III: CPT | Mod: PBBFAC,,, | Performed by: NURSE PRACTITIONER

## 2024-03-08 PROCEDURE — 1159F MED LIST DOCD IN RCRD: CPT | Mod: CPTII,S$GLB,, | Performed by: NURSE PRACTITIONER

## 2024-03-08 PROCEDURE — 3079F DIAST BP 80-89 MM HG: CPT | Mod: CPTII,S$GLB,, | Performed by: NURSE PRACTITIONER

## 2024-03-08 PROCEDURE — 1101F PT FALLS ASSESS-DOCD LE1/YR: CPT | Mod: CPTII,S$GLB,, | Performed by: NURSE PRACTITIONER

## 2024-03-08 PROCEDURE — 1126F AMNT PAIN NOTED NONE PRSNT: CPT | Mod: CPTII,S$GLB,, | Performed by: NURSE PRACTITIONER

## 2024-03-08 PROCEDURE — 3288F FALL RISK ASSESSMENT DOCD: CPT | Mod: CPTII,S$GLB,, | Performed by: NURSE PRACTITIONER

## 2024-03-08 PROCEDURE — 3074F SYST BP LT 130 MM HG: CPT | Mod: CPTII,S$GLB,, | Performed by: NURSE PRACTITIONER

## 2024-03-08 RX ORDER — MONTELUKAST SODIUM 10 MG/1
10 TABLET ORAL NIGHTLY
Qty: 30 TABLET | Refills: 0 | Status: SHIPPED | OUTPATIENT
Start: 2024-03-08 | End: 2024-03-11 | Stop reason: SDUPTHER

## 2024-03-08 NOTE — PROGRESS NOTES
Subjective:       Patient ID: Anitra Fermin is a 78 y.o. female.    Chief Complaint: Follow-up    Anitra Fermin presents to the clinic today for follow up on Bronchitis  Recently moved from [facility] to [facility] to assist her sister who she reports is now on Hospice  Was under the care of PCP: Yaron Contreras  Patient Active Problem List  Diagnosis  · Age-related osteoporosis without current pathological fracture  · Major depression, single episode  · Mixed hyperlipidemia  · Peripheral vertigo  · Restless legs syndrome  · Hypothyroidism  · Chronic interstitial lung disease  · Personal history of rheumatoid arthritis  · Anemia  · Situational anxiety         She was seen in the ER 2/25/2024 for URI symptoms- she was treated for Bronchitis with a Zpack and prednisone taper.   Reports she has completed this medication, but continues to have a cough  Was provided an Albuterol inhaler, but is unable to use due to difficulty pressing this in  Reports history of RA and was followed by Rheumatology previously, but has not seen in sometime.  Reports was prescribed a medication- does not recall the name of this either  Would like a new referral to Rheumatology   Sister has a cat- reports increased itchy watery eyes, post nasal drainage since coming to [place]     Requesting refills on medications to be sent to Mail order  Unable to verify Dilantin prescription- historical records reviewed with no dx of seizure disorder noted. Denies history of seizures.        Review of Systems    Patient Active Problem List   Diagnosis    Age-related osteoporosis without current pathological fracture    Major depression, single episode    Mixed hyperlipidemia    Peripheral vertigo    Restless legs syndrome    Hypothyroidism    Chronic interstitial lung disease    Personal history of rheumatoid arthritis    Anemia    Situational anxiety       Objective:      Physical Exam  Constitutional:       General: She is not in acute distress.     Appearance:  Normal appearance. She is not ill-appearing.   HENT:      Right Ear: Tympanic membrane normal.      Left Ear: Tympanic membrane normal.      Nose: Rhinorrhea present. Rhinorrhea is clear.      Mouth/Throat:      Lips: Pink.      Pharynx: Oropharynx is clear. Uvula midline. No posterior oropharyngeal erythema or uvula swelling.   Cardiovascular:      Rate and Rhythm: Normal rate and regular rhythm.      Heart sounds: Normal heart sounds.   Pulmonary:      Effort: Pulmonary effort is normal. No respiratory distress.      Breath sounds: Normal breath sounds. No wheezing.   Musculoskeletal:      Right hand: Deformity present.      Left hand: Deformity present.   Lymphadenopathy:      Cervical: No cervical adenopathy.   Skin:     General: Skin is warm and dry.   Neurological:      Mental Status: She is alert and oriented to person, place, and time.   Psychiatric:         Mood and Affect: Mood normal.         Behavior: Behavior normal.         Lab Results   Component Value Date    WBC 6.70 01/18/2024    HGB 12.0 01/18/2024    HCT 38.4 01/18/2024     01/18/2024    CHOL 172 01/25/2024    TRIG 216 (H) 01/25/2024    HDL 50 01/25/2024    ALT 7 (L) 01/18/2024    AST 14 01/18/2024     01/18/2024    K 4.1 01/18/2024     01/18/2024    CREATININE 0.8 01/18/2024    BUN 20 01/18/2024    CO2 24 01/18/2024    TSH 4.824 (H) 01/25/2024     The 10-year ASCVD risk score (Shad PEREZ, et al., 2019) is: 21.6%    Values used to calculate the score:      Age: 78 years      Sex: Female      Is Non- : No      Diabetic: No      Tobacco smoker: No      Systolic Blood Pressure: 128 mmHg      Is BP treated: No      HDL Cholesterol: 50 mg/dL      Total Cholesterol: 172 mg/dL  Visit Vitals  /80 (BP Location: Left arm, Patient Position: Sitting, BP Method: Medium (Manual))   Pulse 67   Wt 62.2 kg (137 lb 3.2 oz)   SpO2 97%   BMI 23.55 kg/m²      Assessment:       1. Chronic interstitial lung disease    2.  Hypothyroidism, unspecified type    3. Age-related osteoporosis without current pathological fracture    4. Restless legs syndrome    5. Situational anxiety    6. Anemia, unspecified type    7. Mixed hyperlipidemia    8. Personal history of rheumatoid arthritis        Plan:       1. Chronic interstitial lung disease  -     montelukast (SINGULAIR) 10 mg tablet; Take 1 tablet (10 mg total) by mouth every evening.  Dispense: 30 tablet; Refill: 0  + Singulair nightly  Encouraged saline nasal flushes/rinses     2. Hypothyroidism, Subclinical  Historically was taking levothyroxine- has not had in a few months  Recheck labs  in 3 months     3. Age-related osteoporosis without current pathological fracture  -     alendronate (FOSAMAX) 70 MG tablet; Take 1 tablet (70 mg total) by mouth every 7 days.  Dispense: 12 tablet; Refill: 3  Continue current medication regimen   4. Restless legs syndrome  -     rOPINIRole (REQUIP) 0.5 MG tablet; Take 1 tablet (0.5 mg total) by mouth every evening.  Dispense: 90 tablet; Refill: 1  Continue current medication regimen   5. Situational anxiety  Hydroxyzine as needed- has utd prescription  6. Anemia, unspecified type  -     ferrous sulfate (FEOSOL) 325 mg (65 mg iron) Tab tablet; Take 1 tablet (325 mg total) by mouth daily with breakfast.  Dispense: 90 tablet; Refill: 1  Continue current medication regimen   7. Mixed hyperlipidemia  -     rosuvastatin (CRESTOR) 10 MG tablet; Take 1 tablet (10 mg total) by mouth once daily.  Dispense: 90 tablet; Refill: 3  Continue current medication regimen   8. Personal history of rheumatoid arthritis  -     Ambulatory referral/consult to Rheumatology; Future; Expected date: 03/15/2024  Rheumatology referral  Encouraged to reach out to mail order to request no child proof lids/ pill pack      Follow up in about 6 weeks (around 4/19/2024).  Dilantin refill not provided as chart review shows no appropriate diagnosis for medication, unsure if patient has  consistently been taking as she does not recall reason- denies seizure history.   Records request for further review.     Future Appointments       Date Provider Specialty Appt Notes    4/9/2024 Garima Gonzalez, DON Family Medicine Follow Up

## 2024-03-10 ENCOUNTER — NURSE TRIAGE (OUTPATIENT)
Dept: ADMINISTRATIVE | Facility: CLINIC | Age: 79
End: 2024-03-10
Payer: MEDICARE

## 2024-03-10 NOTE — TELEPHONE ENCOUNTER
Caller states that she was seen in office x 2 days ago. Caller states that prescription for Singulair was to be sent to [facility], caller states medication not at pharmacy. Pt EMR shows that medication was sent to mail order pharmacy. Caller is requesting that medication be sent to local pharmacy. Attempted to contact provider, facility not listed in on call finder. [facility]  contacted when office dial directly, transferred to number listed for facility per , directed back to [facility] on call. Caller inform that I was unable to contact on call provider. Suggested that caller contact Pharmacy directly to request transfer of prescription and message will be sent to office for further assistance.  Pt advised per protocol and verbalized understanding.     Reason for Disposition   [1] Prescription not at pharmacy AND [2] was prescribed by PCP recently  (Exception: Triager has access to EMR and prescription is recorded there. Go to Home Care and confirm for pharmacy.)    Protocols used: Medication Refill and Renewal Call-A-

## 2024-03-11 ENCOUNTER — TELEPHONE (OUTPATIENT)
Dept: FAMILY MEDICINE | Facility: CLINIC | Age: 79
End: 2024-03-11
Payer: MEDICARE

## 2024-03-11 DIAGNOSIS — J84.9 CHRONIC INTERSTITIAL LUNG DISEASE: ICD-10-CM

## 2024-03-11 PROBLEM — M81.0 AGE-RELATED OSTEOPOROSIS WITHOUT CURRENT PATHOLOGICAL FRACTURE: Status: ACTIVE | Noted: 2024-03-11

## 2024-03-11 PROBLEM — E78.2 MIXED HYPERLIPIDEMIA: Status: ACTIVE | Noted: 2024-03-11

## 2024-03-11 PROBLEM — E03.9 HYPOTHYROIDISM: Status: ACTIVE | Noted: 2024-03-11

## 2024-03-11 PROBLEM — G25.81 RESTLESS LEGS SYNDROME: Status: ACTIVE | Noted: 2024-03-11

## 2024-03-11 PROBLEM — F41.8 SITUATIONAL ANXIETY: Status: ACTIVE | Noted: 2024-03-11

## 2024-03-11 PROBLEM — D64.9 ANEMIA: Status: ACTIVE | Noted: 2024-03-11

## 2024-03-11 PROBLEM — H81.399 PERIPHERAL VERTIGO: Status: ACTIVE | Noted: 2024-03-11

## 2024-03-11 PROBLEM — Z87.39 PERSONAL HISTORY OF RHEUMATOID ARTHRITIS: Status: ACTIVE | Noted: 2024-03-11

## 2024-03-11 PROBLEM — F32.9 MAJOR DEPRESSION, SINGLE EPISODE: Status: ACTIVE | Noted: 2024-03-11

## 2024-03-11 RX ORDER — LEVOTHYROXINE SODIUM 75 UG/1
75 TABLET ORAL
Qty: 90 TABLET | Refills: 1 | Status: SHIPPED | OUTPATIENT
Start: 2024-03-11 | End: 2024-03-11 | Stop reason: CLARIF

## 2024-03-11 RX ORDER — ROPINIROLE 0.5 MG/1
0.5 TABLET, FILM COATED ORAL NIGHTLY
Qty: 90 TABLET | Refills: 1 | Status: SHIPPED | OUTPATIENT
Start: 2024-03-11

## 2024-03-11 RX ORDER — ROSUVASTATIN CALCIUM 10 MG/1
10 TABLET, COATED ORAL DAILY
Qty: 90 TABLET | Refills: 3 | Status: SHIPPED | OUTPATIENT
Start: 2024-03-11

## 2024-03-11 RX ORDER — MONTELUKAST SODIUM 10 MG/1
10 TABLET ORAL NIGHTLY
Qty: 30 TABLET | Refills: 0 | Status: SHIPPED | OUTPATIENT
Start: 2024-03-11 | End: 2024-04-09 | Stop reason: SDUPTHER

## 2024-03-11 RX ORDER — ALENDRONATE SODIUM 70 MG/1
70 TABLET ORAL
Qty: 12 TABLET | Refills: 3 | Status: SHIPPED | OUTPATIENT
Start: 2024-03-11

## 2024-03-11 RX ORDER — FERROUS SULFATE 325(65) MG
325 TABLET ORAL
Qty: 90 TABLET | Refills: 1 | Status: SHIPPED | OUTPATIENT
Start: 2024-03-11

## 2024-03-11 NOTE — TELEPHONE ENCOUNTER
Rx sent to [facility] pt requesting pharmacy change to [facility] pharmacy. RX & Pharmacy pended

## 2024-03-11 NOTE — TELEPHONE ENCOUNTER
----- Message from Kesha Lane sent at 3/11/2024  9:17 AM CDT -----  Contact: self  Type:  RX Refill Request    Who Called: Pt  Refill or New Rx:Refill   RX Name and Strength: montelukast (SINGULAIR) 10 mg tablet  How is the patient currently taking it? (ex. 1XDay): as directed  Is this a 30 day or 90 day RX: 30  Preferred Pharmacy with phone number:   [facility] [address] [place] [address], [place] [address] [address]  Phone: [phone] Fax: [phone]  Local or Mail Order: Local  Ordering Provider: Garima Gonzalez NP  Would the patient rather a call back or a response via [facility]? Call  Best Call Back Number: [phone]  Pt states that the meds went to [facility] and she is completely out and need Rx sent to local pharmacy... Please call to advise... Thank you

## 2024-03-11 NOTE — TELEPHONE ENCOUNTER
----- Message from Cheryl Kimble sent at 3/11/2024 12:29 PM CDT -----  Contact: PT  Type:  RX TRANSFER Request    Who Called: PT   Refill or New Rx:REFILL   RX Name and Strength: montelukast (SINGULAIR) 10 mg tablet  Preferred Pharmacy with phone number:  [facility] [address] [place] [address], [place] [address] [address]  Phone: [phone] Fax: [phone]  Local or Mail Order:LOCAL  Ordering Provider:BYRON   Would the patient rather a call back or a response via [facility]? CALL   Best Call Back Number: [phone]  Additional Information:  PT RX WAS CALLED INTO THE WRONG PHARMACY. PLEASE CALL RX INTO THE PHARMACY LISTED ABOVE ASAP. PLEASE ALSO F/U WITH PT TO CONFIRM.   THANK YOU

## 2024-03-13 DIAGNOSIS — Z78.0 MENOPAUSE: ICD-10-CM

## 2024-03-14 ENCOUNTER — TELEPHONE (OUTPATIENT)
Dept: FAMILY MEDICINE | Facility: CLINIC | Age: 79
End: 2024-03-14
Payer: MEDICARE

## 2024-03-14 NOTE — TELEPHONE ENCOUNTER
----- Message from Tiffanymorenita Otilia sent at 3/14/2024 10:02 AM CDT -----  Regarding: Return Call  Patient Returning Call      Who Called: Pt     Who Left Message for Patient: Tory JORDANA     Does the patient know what this is regarding?: Yes     Would the patient rather a call back or a response via [facility]?  Call back     Best Call Back Number: [phone]      Additional Information:  Please Advise - Thank you

## 2024-03-18 ENCOUNTER — OFFICE VISIT (OUTPATIENT)
Dept: FAMILY MEDICINE | Facility: CLINIC | Age: 79
End: 2024-03-18
Payer: MEDICARE

## 2024-03-18 ENCOUNTER — TELEPHONE (OUTPATIENT)
Dept: FAMILY MEDICINE | Facility: CLINIC | Age: 79
End: 2024-03-18
Payer: MEDICARE

## 2024-03-18 VITALS
DIASTOLIC BLOOD PRESSURE: 72 MMHG | WEIGHT: 137.63 LBS | SYSTOLIC BLOOD PRESSURE: 128 MMHG | BODY MASS INDEX: 23.62 KG/M2 | OXYGEN SATURATION: 93 % | HEART RATE: 75 BPM

## 2024-03-18 DIAGNOSIS — J01.40 ACUTE PANSINUSITIS, RECURRENCE NOT SPECIFIED: ICD-10-CM

## 2024-03-18 DIAGNOSIS — J04.0 LARYNGITIS, ACUTE: Primary | ICD-10-CM

## 2024-03-18 DIAGNOSIS — H92.03 OTALGIA OF BOTH EARS: ICD-10-CM

## 2024-03-18 DIAGNOSIS — J84.9 CHRONIC INTERSTITIAL LUNG DISEASE: ICD-10-CM

## 2024-03-18 PROCEDURE — 3288F FALL RISK ASSESSMENT DOCD: CPT | Mod: CPTII,S$GLB,, | Performed by: NURSE PRACTITIONER

## 2024-03-18 PROCEDURE — 3078F DIAST BP <80 MM HG: CPT | Mod: CPTII,S$GLB,, | Performed by: NURSE PRACTITIONER

## 2024-03-18 PROCEDURE — 1159F MED LIST DOCD IN RCRD: CPT | Mod: CPTII,S$GLB,, | Performed by: NURSE PRACTITIONER

## 2024-03-18 PROCEDURE — 99999 PR PBB SHADOW E&M-EST. PATIENT-LVL III: CPT | Mod: PBBFAC,,, | Performed by: NURSE PRACTITIONER

## 2024-03-18 PROCEDURE — 3074F SYST BP LT 130 MM HG: CPT | Mod: CPTII,S$GLB,, | Performed by: NURSE PRACTITIONER

## 2024-03-18 PROCEDURE — 1101F PT FALLS ASSESS-DOCD LE1/YR: CPT | Mod: CPTII,S$GLB,, | Performed by: NURSE PRACTITIONER

## 2024-03-18 PROCEDURE — 1126F AMNT PAIN NOTED NONE PRSNT: CPT | Mod: CPTII,S$GLB,, | Performed by: NURSE PRACTITIONER

## 2024-03-18 PROCEDURE — 99213 OFFICE O/P EST LOW 20 MIN: CPT | Mod: S$GLB,,, | Performed by: NURSE PRACTITIONER

## 2024-03-18 RX ORDER — CEFDINIR 300 MG/1
300 CAPSULE ORAL 2 TIMES DAILY
Qty: 20 CAPSULE | Refills: 0 | Status: SHIPPED | OUTPATIENT
Start: 2024-03-18 | End: 2024-04-09 | Stop reason: ALTCHOICE

## 2024-03-18 RX ORDER — PREDNISONE 20 MG/1
TABLET ORAL
Qty: 15 TABLET | Refills: 0 | Status: SHIPPED | OUTPATIENT
Start: 2024-03-18 | End: 2024-04-09 | Stop reason: ALTCHOICE

## 2024-03-18 NOTE — PROGRESS NOTES
Subjective:       Patient ID: Anitra Fermin is a 78 y.o. female.    Chief Complaint: Hoarse, Ear Fullness, and Cough     Anitra Fermin presents to the clinic today bilateral ear pain and hoarse voice   [facility] her sister who she reports is now on Hospice  Was under the care of PCP: Yaron Contreras  Patient Active Problem List  Diagnosis  ·           Age-related osteoporosis without current pathological fracture  ·           Major depression, single episode  ·           Mixed hyperlipidemia  ·           Peripheral vertigo  ·           Restless legs syndrome  ·           Hypothyroidism  ·           Chronic interstitial lung disease  ·           Personal history of rheumatoid arthritis  ·           Anemia  ·           Situational anxiety           She was seen in the ER 2/25/2024 for URI symptoms- she was treated for Bronchitis with a Zpack and prednisone taper.   Reports she has completed this medication, but continues to have a cough  Was provided an Albuterol inhaler, but is unable to use due to difficulty pressing this in  She was seen in the clinic for a follow up on 3/8/2024- symptoms were essentially resolving, but still had intermittent coughing.  Sister has a cat- reports increased itchy watery eyes, post nasal drainage since coming to [place]   Reports over the weekend she began to develop hoarse voice with almost complete loss- reports it was down to a whisper, bilateral ear pain/pressure, increased nasal congestion/sinus pressure with thick green/yellow mucus               Review of Systems    Patient Active Problem List   Diagnosis    Age-related osteoporosis without current pathological fracture    Major depression, single episode    Mixed hyperlipidemia    Peripheral vertigo    Restless legs syndrome    Hypothyroidism    Chronic interstitial lung disease    Personal history of rheumatoid arthritis    Anemia    Situational anxiety       Objective:      Physical  Exam  Constitutional:       General: She is not in acute distress.     Appearance: Normal appearance. She is not ill-appearing.   HENT:      Right Ear: Tympanic membrane is bulging.      Left Ear: A middle ear effusion is present. Tympanic membrane is injected and erythematous.      Nose: Mucosal edema and rhinorrhea present. Rhinorrhea is purulent.      Right Sinus: Maxillary sinus tenderness present.      Left Sinus: Maxillary sinus tenderness present.      Mouth/Throat:      Lips: Pink.      Pharynx: Oropharynx is clear. Uvula midline. Posterior oropharyngeal erythema present. No uvula swelling.   Cardiovascular:      Rate and Rhythm: Normal rate and regular rhythm.      Heart sounds: Normal heart sounds.   Pulmonary:      Effort: Pulmonary effort is normal. No respiratory distress.      Breath sounds: Normal breath sounds. No wheezing.   Musculoskeletal:      Right hand: Deformity present.      Left hand: Deformity present.   Lymphadenopathy:      Cervical: No cervical adenopathy.   Skin:     General: Skin is warm and dry.   Neurological:      Mental Status: She is alert and oriented to person, place, and time.   Psychiatric:         Mood and Affect: Mood normal.         Behavior: Behavior normal.         Lab Results   Component Value Date    WBC 6.70 01/18/2024    HGB 12.0 01/18/2024    HCT 38.4 01/18/2024     01/18/2024    CHOL 172 01/25/2024    TRIG 216 (H) 01/25/2024    HDL 50 01/25/2024    ALT 7 (L) 01/18/2024    AST 14 01/18/2024     01/18/2024    K 4.1 01/18/2024     01/18/2024    CREATININE 0.8 01/18/2024    BUN 20 01/18/2024    CO2 24 01/18/2024    TSH 4.824 (H) 01/25/2024     The 10-year ASCVD risk score (Shad DK, et al., 2019) is: 21.6%    Values used to calculate the score:      Age: 78 years      Sex: Female      Is Non- : No      Diabetic: No      Tobacco smoker: No      Systolic Blood Pressure: 128 mmHg      Is BP treated: No      HDL Cholesterol: 50  mg/dL      Total Cholesterol: 172 mg/dL  Visit Vitals  /72 (BP Location: Left arm, Patient Position: Sitting, BP Method: Medium (Manual))   Pulse 75   Wt 62.4 kg (137 lb 9.6 oz)   SpO2 (!) 93%   BMI 23.62 kg/m²      Assessment:       1. Laryngitis, acute    2. Acute pansinusitis, recurrence not specified    3. Otalgia of both ears    4. Otalgia, unspecified laterality        Plan:       1. Laryngitis, acute  -     predniSONE (DELTASONE) 20 MG tablet; Take 3 tablets by mouth once a day x 3 days.  Then take 2 tablets by mouth once a day x 2 days.  Then take 1 tablet by mouth once a day x 2 days.  Dispense: 15 tablet; Refill: 0    2. Acute pansinusitis, recurrence not specified  -     cefdinir (OMNICEF) 300 MG capsule; Take 1 capsule (300 mg total) by mouth 2 (two) times daily. for 10 days  Dispense: 20 capsule; Refill: 0  -     predniSONE (DELTASONE) 20 MG tablet; Take 3 tablets by mouth once a day x 3 days.  Then take 2 tablets by mouth once a day x 2 days.  Then take 1 tablet by mouth once a day x 2 days.  Dispense: 15 tablet; Refill: 0  saline nasal flushes 2-3 times daily/as needed for increased sinus/nasal congestion  Warm compresses to the face for sinus pressure  Take medications as prescribed with food until all gone   Warm salt water gargles, throat lozenges, warm honey/lemon as needed for sore throat  maintain hydration  cool mist humidifier at night for increased congestion  rtc if s/sx worsen/not improving after 7- 10 days     3. Otalgia of both ears  Warm compresses to ears for discomfort     Discussed if return of s/sx after abx treatment further evaluation warranted- ENT referral discussed.       No follow-ups on file.      Future Appointments       Date Provider Specialty Appt Notes    4/9/2024 Garima Gonzalez NP Family Medicine Follow Up

## 2024-03-18 NOTE — TELEPHONE ENCOUNTER
----- Message from Cheryl Kimble sent at 3/18/2024  8:38 AM CDT -----  Contact: PT  Type:  Patient Returning Call    Who Called:PT   Who Left Message for Patient:BRADEN  Does the patient know what this is regarding?:CALL DISCONNECTED   Would the patient rather a call back or a response via [facility]? CALL   Best Call Back Number:[phone]  Additional Information: CALL DISCONNECTED PLEASE CALL BACK IF NEEDED  THANK YOU

## 2024-03-18 NOTE — PATIENT INSTRUCTIONS
Take antibiotic with food until it is all gone    Home made rice pack for ear pain  Make warm, but not too hot     Warm salt water gargles, warm liquids, warm honey/lemon for throat

## 2024-03-18 NOTE — TELEPHONE ENCOUNTER
----- Message from Lo Whelan sent at 3/18/2024  8:24 AM CDT -----  Contact: PT  Type:  Same Day Appointment Request    Caller is requesting a same day appointment.  Caller declined first available appointment listed below.      Name of Caller:  PT  When is the first available appointment?  3/19/24  Symptoms:  Bronchitis, Sore Throat, Ear Pain  Best Call Back Number:  [phone]  Additional Information:   Asking to be seen today if possible, can come in at anytime

## 2024-03-20 ENCOUNTER — PATIENT OUTREACH (OUTPATIENT)
Dept: ADMINISTRATIVE | Facility: HOSPITAL | Age: 79
End: 2024-03-20
Payer: MEDICARE

## 2024-03-20 DIAGNOSIS — Z78.0 MENOPAUSE: ICD-10-CM

## 2024-04-09 ENCOUNTER — LAB VISIT (OUTPATIENT)
Dept: LAB | Facility: HOSPITAL | Age: 79
End: 2024-04-09
Attending: NURSE PRACTITIONER
Payer: MEDICARE

## 2024-04-09 ENCOUNTER — OFFICE VISIT (OUTPATIENT)
Dept: FAMILY MEDICINE | Facility: CLINIC | Age: 79
End: 2024-04-09
Payer: MEDICARE

## 2024-04-09 VITALS
DIASTOLIC BLOOD PRESSURE: 72 MMHG | HEART RATE: 75 BPM | OXYGEN SATURATION: 96 % | WEIGHT: 139.38 LBS | BODY MASS INDEX: 23.93 KG/M2 | SYSTOLIC BLOOD PRESSURE: 130 MMHG

## 2024-04-09 DIAGNOSIS — E03.8 SUBCLINICAL HYPOTHYROIDISM: ICD-10-CM

## 2024-04-09 DIAGNOSIS — Z74.09 IMPAIRED MOBILITY AND ENDURANCE: ICD-10-CM

## 2024-04-09 DIAGNOSIS — N39.46 MIXED STRESS AND URGE INCONTINENCE: ICD-10-CM

## 2024-04-09 DIAGNOSIS — J84.9 CHRONIC INTERSTITIAL LUNG DISEASE: Primary | ICD-10-CM

## 2024-04-09 LAB
T4 FREE SERPL-MCNC: 0.86 NG/DL (ref 0.71–1.51)
TSH SERPL DL<=0.005 MIU/L-ACNC: 2.26 UIU/ML (ref 0.4–4)

## 2024-04-09 PROCEDURE — 99999 PR PBB SHADOW E&M-EST. PATIENT-LVL IV: CPT | Mod: PBBFAC,,, | Performed by: NURSE PRACTITIONER

## 2024-04-09 PROCEDURE — 84439 ASSAY OF FREE THYROXINE: CPT | Performed by: NURSE PRACTITIONER

## 2024-04-09 PROCEDURE — 3078F DIAST BP <80 MM HG: CPT | Mod: CPTII,S$GLB,, | Performed by: NURSE PRACTITIONER

## 2024-04-09 PROCEDURE — 3075F SYST BP GE 130 - 139MM HG: CPT | Mod: CPTII,S$GLB,, | Performed by: NURSE PRACTITIONER

## 2024-04-09 PROCEDURE — 84443 ASSAY THYROID STIM HORMONE: CPT | Performed by: NURSE PRACTITIONER

## 2024-04-09 PROCEDURE — 3288F FALL RISK ASSESSMENT DOCD: CPT | Mod: CPTII,S$GLB,, | Performed by: NURSE PRACTITIONER

## 2024-04-09 PROCEDURE — 1126F AMNT PAIN NOTED NONE PRSNT: CPT | Mod: CPTII,S$GLB,, | Performed by: NURSE PRACTITIONER

## 2024-04-09 PROCEDURE — 99214 OFFICE O/P EST MOD 30 MIN: CPT | Mod: S$GLB,,, | Performed by: NURSE PRACTITIONER

## 2024-04-09 PROCEDURE — 1160F RVW MEDS BY RX/DR IN RCRD: CPT | Mod: CPTII,S$GLB,, | Performed by: NURSE PRACTITIONER

## 2024-04-09 PROCEDURE — 36415 COLL VENOUS BLD VENIPUNCTURE: CPT | Performed by: NURSE PRACTITIONER

## 2024-04-09 PROCEDURE — 1101F PT FALLS ASSESS-DOCD LE1/YR: CPT | Mod: CPTII,S$GLB,, | Performed by: NURSE PRACTITIONER

## 2024-04-09 PROCEDURE — 1159F MED LIST DOCD IN RCRD: CPT | Mod: CPTII,S$GLB,, | Performed by: NURSE PRACTITIONER

## 2024-04-09 RX ORDER — MONTELUKAST SODIUM 10 MG/1
10 TABLET ORAL NIGHTLY
Qty: 30 TABLET | Refills: 5 | Status: SHIPPED | OUTPATIENT
Start: 2024-04-09

## 2024-04-09 RX ORDER — BUDESONIDE AND FORMOTEROL FUMARATE DIHYDRATE 80; 4.5 UG/1; UG/1
2 AEROSOL RESPIRATORY (INHALATION) 2 TIMES DAILY
Qty: 10.2 G | Refills: 11 | Status: SHIPPED | OUTPATIENT
Start: 2024-04-09

## 2024-04-09 NOTE — PATIENT INSTRUCTIONS
[facility]  [address] [place] [address]  Phone [phone]  8am - 6pm EST M-F

## 2024-04-09 NOTE — PROGRESS NOTES
Subjective:       Patient ID: Anitra Fermin is a 78 y.o. female.    Chief Complaint: Follow-up    Anitra Fermin presents to the clinic today for follow up   Recently moved from [facility] to assist her sister who she reports is now on Hospice  Was under the care of PCP: Yaron Contreras  Patient Active Problem List  Diagnosis  ·           Age-related osteoporosis without current pathological fracture  ·           Major depression, single episode  ·           Mixed hyperlipidemia  ·           Peripheral vertigo  ·           Restless legs syndrome  ·           Hypothyroidism  ·           Chronic interstitial lung disease  ·           Personal history of rheumatoid arthritis  ·           Anemia  ·           Situational anxiety        History of RA- was referred to provider with [facility], patient has not heard from their office. Will resubmit referral/provide patient contact information for follow up    Interstitial lung disease  Denies history of asthma. Was a smoker > 40 years ago. Took singulair- reports she does not remember if she noticed a difference with this. Home she is living in has 2 cats, a smoker, large amount of oak,pine trees.    Suffered closed fx of left hip following accidental fall 5/2023  Had a short stay in SNF  Following this she slowly has began to develop mixed/stress urinary incontinence.   Wears incontinence briefs  Unsure if this is related or if she is having more issues now due to increased coughing and worsening arthritis/changes to mobility.   Ambulates with cane.      Review of Systems    Patient Active Problem List   Diagnosis    Age-related osteoporosis without current pathological fracture    Major depression, single episode    Mixed hyperlipidemia    Peripheral vertigo    Restless legs syndrome    Hypothyroidism    Chronic interstitial lung disease    Personal history of rheumatoid arthritis    Anemia    Situational anxiety       Objective:      Physical  Exam  Constitutional:       General: She is not in acute distress.     Appearance: Normal appearance. She is not ill-appearing.   HENT:      Right Ear: Tympanic membrane normal.      Left Ear: Tympanic membrane normal.      Nose: Rhinorrhea present. Rhinorrhea is clear.      Mouth/Throat:      Lips: Pink.      Pharynx: Oropharynx is clear. Uvula midline. No posterior oropharyngeal erythema or uvula swelling.   Eyes:      Extraocular Movements: Extraocular movements intact.      Conjunctiva/sclera: Conjunctivae normal.      Comments: Corrective lenses    Cardiovascular:      Rate and Rhythm: Normal rate and regular rhythm.      Heart sounds: Normal heart sounds.   Pulmonary:      Effort: Pulmonary effort is normal. No respiratory distress.      Breath sounds: Normal breath sounds. No wheezing.   Musculoskeletal:      Right hand: Deformity present.      Left hand: Deformity present.   Lymphadenopathy:      Cervical: No cervical adenopathy.   Skin:     General: Skin is warm and dry.   Neurological:      Mental Status: She is alert and oriented to person, place, and time.   Psychiatric:         Mood and Affect: Mood normal.         Behavior: Behavior normal.         Lab Results   Component Value Date    WBC 6.70 01/18/2024    HGB 12.0 01/18/2024    HCT 38.4 01/18/2024     01/18/2024    CHOL 172 01/25/2024    TRIG 216 (H) 01/25/2024    HDL 50 01/25/2024    ALT 7 (L) 01/18/2024    AST 14 01/18/2024     01/18/2024    K 4.1 01/18/2024     01/18/2024    CREATININE 0.8 01/18/2024    BUN 20 01/18/2024    CO2 24 01/18/2024    TSH 4.824 (H) 01/25/2024     The 10-year ASCVD risk score (Shad PEREZ, et al., 2019) is: 22.2%    Values used to calculate the score:      Age: 78 years      Sex: Female      Is Non- : No      Diabetic: No      Tobacco smoker: No      Systolic Blood Pressure: 130 mmHg      Is BP treated: No      HDL Cholesterol: 50 mg/dL      Total Cholesterol: 172 mg/dL  Visit  Vitals  /72 (BP Location: Left arm, Patient Position: Sitting, BP Method: Medium (Manual))   Pulse 75   Wt 63.2 kg (139 lb 6.4 oz)   SpO2 96%   BMI 23.93 kg/m²      Assessment:       1. Chronic interstitial lung disease    2. Mixed stress and urge incontinence    3. Subclinical hypothyroidism    4. Impaired mobility and endurance        Plan:       1. Chronic interstitial lung disease  -     montelukast (SINGULAIR) 10 mg tablet; Take 1 tablet (10 mg total) by mouth every evening.  Dispense: 30 tablet; Refill: 5  -     budesonide-formoterol 80-4.5 mcg (SYMBICORT) 80-4.5 mcg/actuation HFAA; Inhale 2 puffs into the lungs 2 (two) times a day. Controller  Dispense: 10.2 g; Refill: 11  Continue Singulair  + Symbicort- rinse mouth after each use  Avoid allergen triggers  Saline nasal rinses/flushes at least 2 times daily   2. Mixed stress and urge incontinence  Provided information for [facility] supplies  Discussed supportive/conservative treatment: ex: Kegel exercises vs medication management, discussed medication use with possible side effects- wishes to try Kegel exercises first vs medications   3. Subclinical hypothyroidism  Repeat thyroid blood work for review.   4. Impaired mobility and endurance  DME at all times  Contact Rheumatology with [facility] to schedule appointment.      Follow up in about 3 months (around 7/9/2024).      Future Appointments       Date Provider Specialty Appt Notes    7/11/2024 Garima Gonzalez NP Family Medicine 3 Month Follow Up

## 2024-04-24 ENCOUNTER — TELEPHONE (OUTPATIENT)
Dept: FAMILY MEDICINE | Facility: CLINIC | Age: 79
End: 2024-04-24
Payer: MEDICARE

## 2024-04-24 NOTE — TELEPHONE ENCOUNTER
----- Message from Liam Qureshi sent at 4/24/2024  1:44 PM CDT -----  Contact: self  Type: Sooner Appointment Request        Caller is requesting a sooner appointment. Caller declined first available appointment listed below. Caller will not accept being placed on the waitlist and is requesting a message be sent to doctor.        Name of Caller: Patient   Best Call Back Number: [phone]  Additional Information: Pt wants her rheumatology referral faxed to the [facility] fax to [phone]. Thanks

## 2024-05-06 ENCOUNTER — TELEPHONE (OUTPATIENT)
Dept: FAMILY MEDICINE | Facility: CLINIC | Age: 79
End: 2024-05-06
Payer: MEDICARE

## 2024-05-06 NOTE — TELEPHONE ENCOUNTER
----- Message from Cheryl Kimble sent at 5/6/2024 12:50 PM CDT -----  Contact: PT  Type:  Patient Returning Call    Who Called:PT   Who Left Message for Patient:BRADEN  Does the patient know what this is regarding?:UPDATE ON REFERRAL TO RHEUMATOLOGY   FAX # [phone]  Would the patient rather a call back or a response via [facility]? CALL   Best Call Back Number: CALL   Additional Information: THANK YOU

## 2024-05-21 DIAGNOSIS — M25.50 POLYARTHRALGIA: ICD-10-CM

## 2024-05-21 DIAGNOSIS — Z87.39 PERSONAL HISTORY OF RHEUMATOID ARTHRITIS: Primary | ICD-10-CM

## 2024-05-21 RX ORDER — IBUPROFEN 800 MG/1
800 TABLET ORAL EVERY 8 HOURS
Qty: 90 TABLET | Refills: 0 | Status: SHIPPED | OUTPATIENT
Start: 2024-05-21

## 2024-05-21 NOTE — TELEPHONE ENCOUNTER
----- Message from Renetta Christensen sent at 5/21/2024  9:29 AM CDT -----  Regarding: Refill Request  Type:  RX Refill Request    Who Called: Pt  Refill or New Rx:Refill    RX Name and Strength: mg tablet   How is the patient currently taking it? (ex. 1XDay):as needed   Is this a 30 day or 90 day RX:30    Preferred Pharmacy with phone number:   [facility] [address] [place] [address], [place] - [address] [address]  Phone: [phone] Fax: [phone]    Local or Mail Order:Local  Ordering Provider: Historical    Would the patient rather a call back or a response via [facility]? Call Back    Best Call Back Number:[phone]      Additional Information:  usually gets filled through mail but ran out and was wondering if a script can be sent to the pharmacy above.     Have a great day. Thank you!

## 2024-09-04 DIAGNOSIS — J84.9 CHRONIC INTERSTITIAL LUNG DISEASE: ICD-10-CM

## 2024-09-04 DIAGNOSIS — Z87.39 PERSONAL HISTORY OF RHEUMATOID ARTHRITIS: ICD-10-CM

## 2024-09-04 DIAGNOSIS — G25.81 RESTLESS LEGS SYNDROME: ICD-10-CM

## 2024-09-04 DIAGNOSIS — M25.50 POLYARTHRALGIA: ICD-10-CM

## 2024-09-04 DIAGNOSIS — F41.8 SITUATIONAL ANXIETY: ICD-10-CM

## 2024-09-05 RX ORDER — IBUPROFEN 800 MG/1
800 TABLET ORAL EVERY 8 HOURS PRN
Qty: 90 TABLET | Refills: 1 | Status: SHIPPED | OUTPATIENT
Start: 2024-09-05

## 2024-09-05 NOTE — TELEPHONE ENCOUNTER
Refill Routing Note   Medication(s) are not appropriate for processing by [facility] for the following reason(s):        Outside of protocol  Non-participating provider    [facility] action(s):  Route               Appointments  past 12m or future 3m with PCP    Date Provider   Last Visit   4/9/2024 Garima Gonzalez NP   Next Visit   9/4/2024 Garima Gonzalez NP   ED visits in past 90 days: 0        Note composed:7:56 AM 09/05/2024

## 2024-09-06 RX ORDER — MONTELUKAST SODIUM 10 MG/1
10 TABLET ORAL NIGHTLY
Qty: 90 TABLET | Refills: 3 | Status: SHIPPED | OUTPATIENT
Start: 2024-09-06

## 2024-09-06 RX ORDER — BUDESONIDE AND FORMOTEROL FUMARATE DIHYDRATE 80; 4.5 UG/1; UG/1
2 AEROSOL RESPIRATORY (INHALATION) 2 TIMES DAILY
Qty: 10.2 G | Refills: 11 | Status: SHIPPED | OUTPATIENT
Start: 2024-09-06

## 2024-09-06 RX ORDER — ROPINIROLE 0.5 MG/1
0.5 TABLET, FILM COATED ORAL NIGHTLY
Qty: 90 TABLET | Refills: 1 | Status: SHIPPED | OUTPATIENT
Start: 2024-09-06

## 2024-09-06 RX ORDER — HYDROXYZINE HYDROCHLORIDE 10 MG/1
10 TABLET, FILM COATED ORAL 3 TIMES DAILY PRN
Qty: 270 TABLET | Refills: 0 | Status: SHIPPED | OUTPATIENT
Start: 2024-09-06

## 2024-11-22 ENCOUNTER — LAB VISIT (OUTPATIENT)
Dept: LAB | Facility: HOSPITAL | Age: 79
End: 2024-11-22
Attending: FAMILY MEDICINE
Payer: MEDICARE

## 2024-11-22 ENCOUNTER — OFFICE VISIT (OUTPATIENT)
Dept: FAMILY MEDICINE | Facility: CLINIC | Age: 79
End: 2024-11-22
Payer: MEDICARE

## 2024-11-22 VITALS
RESPIRATION RATE: 18 BRPM | SYSTOLIC BLOOD PRESSURE: 146 MMHG | HEART RATE: 73 BPM | OXYGEN SATURATION: 91 % | DIASTOLIC BLOOD PRESSURE: 92 MMHG | BODY MASS INDEX: 24.36 KG/M2 | WEIGHT: 142.69 LBS | HEIGHT: 64 IN

## 2024-11-22 DIAGNOSIS — F32.9 MAJOR DEPRESSIVE DISORDER WITH SINGLE EPISODE, REMISSION STATUS UNSPECIFIED: ICD-10-CM

## 2024-11-22 DIAGNOSIS — R22.43 LOCALIZED SWELLING, MASS AND LUMP, LOWER LIMB, BILATERAL: ICD-10-CM

## 2024-11-22 DIAGNOSIS — R03.0 ELEVATED BLOOD PRESSURE READING: ICD-10-CM

## 2024-11-22 DIAGNOSIS — Z23 NEED FOR VACCINATION: Primary | ICD-10-CM

## 2024-11-22 DIAGNOSIS — M79.89 LEG SWELLING: ICD-10-CM

## 2024-11-22 DIAGNOSIS — M79.89 LOCALIZED SWELLING OF LOWER EXTREMITY: ICD-10-CM

## 2024-11-22 LAB
ALBUMIN SERPL BCP-MCNC: 3.5 G/DL (ref 3.5–5.2)
ALP SERPL-CCNC: 84 U/L (ref 40–150)
ALT SERPL W/O P-5'-P-CCNC: 13 U/L (ref 10–44)
ANION GAP SERPL CALC-SCNC: 9 MMOL/L (ref 8–16)
AST SERPL-CCNC: 19 U/L (ref 10–40)
BASOPHILS # BLD AUTO: 0.07 K/UL (ref 0–0.2)
BASOPHILS NFR BLD: 1 % (ref 0–1.9)
BILIRUB SERPL-MCNC: 0.3 MG/DL (ref 0.1–1)
BNP SERPL-MCNC: 70 PG/ML (ref 0–99)
BUN SERPL-MCNC: 11 MG/DL (ref 8–23)
CALCIUM SERPL-MCNC: 9.5 MG/DL (ref 8.7–10.5)
CHLORIDE SERPL-SCNC: 104 MMOL/L (ref 95–110)
CO2 SERPL-SCNC: 25 MMOL/L (ref 23–29)
CREAT SERPL-MCNC: 0.7 MG/DL (ref 0.5–1.4)
DIFFERENTIAL METHOD BLD: ABNORMAL
EOSINOPHIL # BLD AUTO: 0.6 K/UL (ref 0–0.5)
EOSINOPHIL NFR BLD: 8.5 % (ref 0–8)
ERYTHROCYTE [DISTWIDTH] IN BLOOD BY AUTOMATED COUNT: 15.1 % (ref 11.5–14.5)
EST. GFR  (NO RACE VARIABLE): >60 ML/MIN/1.73 M^2
GLUCOSE SERPL-MCNC: 89 MG/DL (ref 70–110)
HCT VFR BLD AUTO: 34.5 % (ref 37–48.5)
HGB BLD-MCNC: 10.6 G/DL (ref 12–16)
IMM GRANULOCYTES # BLD AUTO: 0.01 K/UL (ref 0–0.04)
IMM GRANULOCYTES NFR BLD AUTO: 0.1 % (ref 0–0.5)
LYMPHOCYTES # BLD AUTO: 1.3 K/UL (ref 1–4.8)
LYMPHOCYTES NFR BLD: 19.1 % (ref 18–48)
MCH RBC QN AUTO: 27.2 PG (ref 27–31)
MCHC RBC AUTO-ENTMCNC: 30.7 G/DL (ref 32–36)
MCV RBC AUTO: 89 FL (ref 82–98)
MONOCYTES # BLD AUTO: 0.7 K/UL (ref 0.3–1)
MONOCYTES NFR BLD: 9.7 % (ref 4–15)
NEUTROPHILS # BLD AUTO: 4.2 K/UL (ref 1.8–7.7)
NEUTROPHILS NFR BLD: 61.6 % (ref 38–73)
NRBC BLD-RTO: 0 /100 WBC
PLATELET # BLD AUTO: 369 K/UL (ref 150–450)
PMV BLD AUTO: 11 FL (ref 9.2–12.9)
POTASSIUM SERPL-SCNC: 4.2 MMOL/L (ref 3.5–5.1)
PROT SERPL-MCNC: 8.2 G/DL (ref 6–8.4)
RBC # BLD AUTO: 3.89 M/UL (ref 4–5.4)
SODIUM SERPL-SCNC: 138 MMOL/L (ref 136–145)
TSH SERPL DL<=0.005 MIU/L-ACNC: 3.94 UIU/ML (ref 0.4–4)
WBC # BLD AUTO: 6.79 K/UL (ref 3.9–12.7)

## 2024-11-22 PROCEDURE — 84443 ASSAY THYROID STIM HORMONE: CPT | Performed by: FAMILY MEDICINE

## 2024-11-22 PROCEDURE — 83880 ASSAY OF NATRIURETIC PEPTIDE: CPT | Performed by: FAMILY MEDICINE

## 2024-11-22 PROCEDURE — 99999 PR PBB SHADOW E&M-EST. PATIENT-LVL IV: CPT | Mod: PBBFAC,,, | Performed by: FAMILY MEDICINE

## 2024-11-22 PROCEDURE — 85025 COMPLETE CBC W/AUTO DIFF WBC: CPT | Performed by: FAMILY MEDICINE

## 2024-11-22 PROCEDURE — 80053 COMPREHEN METABOLIC PANEL: CPT | Performed by: FAMILY MEDICINE

## 2024-11-22 PROCEDURE — 36415 COLL VENOUS BLD VENIPUNCTURE: CPT | Performed by: FAMILY MEDICINE

## 2024-11-22 RX ORDER — HYDROCHLOROTHIAZIDE 12.5 MG/1
12.5 TABLET ORAL DAILY
Qty: 30 TABLET | Refills: 11 | Status: SHIPPED | OUTPATIENT
Start: 2024-11-22 | End: 2025-11-22

## 2024-11-22 RX ORDER — LEFLUNOMIDE 20 MG/1
20 TABLET ORAL DAILY
COMMUNITY

## 2024-11-22 NOTE — PROGRESS NOTES
Subjective:       Patient ID: Anitra Fermin is a 79 y.o. female.    Chief Complaint: Swelling (Ankle and knees)    History of Present Illness    CHIEF COMPLAINT:  Ms. Fermin presents today for sudden onset of bilateral leg swelling.    LEG SWELLING AND PAIN:  She reports sudden onset of bilateral leg swelling extending up to her knees that began yesterday. She denies any previous history of leg swelling. She is experiencing severe pain in her ankles, which required taking two ibuprofen to manage the discomfort prior to the appointment. The pain was so intense that she was concerned about her ability to stand without medication. She denies any history of blood clots.    BLOOD PRESSURE:  She presents with elevated blood pressure and denies any history of hypertension or prior episodes of high blood pressure.    RESPIRATORY SYMPTOMS:  She reports experiencing wheezing, which she indicates is normal for her. She also acknowledges a recent onset of cough.    MEDICATIONS:  She reports starting a new medication for arthritis approximately two months ago. She is unable to recall the exact name but states it begins with the letter 'L'.    IMMUNIZATIONS:  She received a flu vaccine in December. She has a history of shingles but has not completed the shingles vaccination series.      ROS:  Respiratory: +cough, +wheezing  Musculoskeletal: +joint pain, +limb swelling         Review of Systems    Patient Active Problem List   Diagnosis    Age-related osteoporosis without current pathological fracture    Major depression, single episode    Mixed hyperlipidemia    Peripheral vertigo    Restless legs syndrome    Hypothyroidism    Chronic interstitial lung disease    Personal history of rheumatoid arthritis    Anemia    Situational anxiety     Anitra has a current medication list which includes the following prescription(s): albuterol, alendronate, epinephrine, ibuprofen, leflunomide, montelukast, phenytoin, ropinirole, rosuvastatin, and  "hydrochlorothiazide.        Health Maintenance Due   Topic Date Due    Hepatitis C Screening  Never done    Pneumococcal Vaccines (Age 65+) (1 of 2 - PCV) Never done    Shingles Vaccine (1 of 2) Never done    RSV Vaccine (Age 60+ and Pregnant patients) (1 - 1-dose 75+ series) Never done    COVID-19 Vaccine (1 - 2024-25 season) Never done      Health Maintenance reviewed and discussed- flu shot today  Objective:      Vitals:    11/22/24 1423   BP: (!) 146/92   Pulse: 73   Resp: 18   SpO2: (!) 91%   Weight: 64.7 kg (142 lb 11.2 oz)   Height: 5' 4" (1.626 m)   PainSc:   9   PainLoc: Ankle     Body mass index is 24.49 kg/m².  Physical Exam  Vitals and nursing note reviewed.   Constitutional:       General: She is not in acute distress.     Appearance: She is not ill-appearing.   Cardiovascular:      Rate and Rhythm: Normal rate and regular rhythm.      Heart sounds: No murmur heard.  Pulmonary:      Effort: Pulmonary effort is normal.      Breath sounds: Wheezing (occasional) present.   Musculoskeletal:      Right lower leg: Edema present.      Left lower leg: Edema present.   Skin:     General: Skin is warm and dry.   Neurological:      Mental Status: She is alert.   Psychiatric:         Mood and Affect: Mood normal.         Behavior: Behavior normal.       Physical Exam    Vitals: Elevated blood pressure.  Respiratory: Wheezing in lungs.  Extremities: Pitting edema up to knees bilaterally.         Assessment:       Assessment & Plan    1. Assessed new onset bilateral lower extremity edema with pitting up to knees  2. Considered potential causes including medication side effects, dietary changes, and underlying medical conditions  3. Evaluated for shortness of breath and wheezing  4. Will investigate further with labs and imaging due to sudden onset and unclear etiology  5. Considered blood pressure elevation as a possible cause or contributing factor           Plan:       1. Need for vaccination  -     influenza " (adjuvanted) (Fluad) 45 mcg/0.5 mL IM vaccine (> or = 66 yo) 0.5 mL    2. Leg swelling  -     BNP; Future; Expected date: 11/22/2024  -     Comprehensive metabolic panel; Future; Expected date: 11/22/2024  -     TSH; Future; Expected date: 11/22/2024  -     CBC auto differential; Future; Expected date: 11/22/2024  -     hydroCHLOROthiazide (HYDRODIURIL) 12.5 MG Tab; Take 1 tablet (12.5 mg total) by mouth once daily.  Dispense: 30 tablet; Refill: 11  -     US Lower Extremity Veins Bilateral; Future; Expected date: 11/22/2024    3. Elevated blood pressure reading  -     Comprehensive metabolic panel; Future; Expected date: 11/22/2024  -     TSH; Future; Expected date: 11/22/2024  -     hydroCHLOROthiazide (HYDRODIURIL) 12.5 MG Tab; Take 1 tablet (12.5 mg total) by mouth once daily.  Dispense: 30 tablet; Refill: 11    4. Localized swelling of lower extremity  -     US Lower Extremity Veins Bilateral; Future; Expected date: 11/22/2024    5. Localized swelling, mass and lump, lower limb, bilateral  -     US Lower Extremity Veins Bilateral; Future; Expected date: 11/22/2024    6. Major depressive disorder with single episode, remission status unspecified  -     TSH; Future; Expected date: 11/22/2024         This note was generated with the assistance of ambient listening technology. Verbal consent was obtained by the patient and accompanying visitor(s) for the recording of patient appointment to facilitate this note. I attest to having reviewed and edited the generated note for accuracy, though some syntax or spelling errors may persist. Please contact the author of this note for any clarification.

## 2024-11-26 ENCOUNTER — PATIENT MESSAGE (OUTPATIENT)
Dept: FAMILY MEDICINE | Facility: CLINIC | Age: 79
End: 2024-11-26
Payer: MEDICARE

## 2024-12-04 ENCOUNTER — HOSPITAL ENCOUNTER (OUTPATIENT)
Dept: RADIOLOGY | Facility: HOSPITAL | Age: 79
Discharge: HOME | End: 2024-12-04
Attending: FAMILY MEDICINE
Payer: MEDICARE

## 2024-12-04 DIAGNOSIS — M79.89 LOCALIZED SWELLING OF LOWER EXTREMITY: ICD-10-CM

## 2024-12-04 DIAGNOSIS — M79.89 LEG SWELLING: ICD-10-CM

## 2024-12-04 DIAGNOSIS — R22.43 LOCALIZED SWELLING, MASS AND LUMP, LOWER LIMB, BILATERAL: ICD-10-CM

## 2024-12-04 PROCEDURE — 93970 EXTREMITY STUDY: CPT | Mod: TC

## 2024-12-19 ENCOUNTER — OFFICE VISIT (OUTPATIENT)
Dept: FAMILY MEDICINE | Facility: CLINIC | Age: 79
End: 2024-12-19
Payer: MEDICARE

## 2024-12-19 VITALS
OXYGEN SATURATION: 94 % | DIASTOLIC BLOOD PRESSURE: 76 MMHG | RESPIRATION RATE: 16 BRPM | SYSTOLIC BLOOD PRESSURE: 124 MMHG | WEIGHT: 143.88 LBS | HEIGHT: 64 IN | BODY MASS INDEX: 24.56 KG/M2 | HEART RATE: 73 BPM

## 2024-12-19 DIAGNOSIS — I10 ESSENTIAL HYPERTENSION: Primary | ICD-10-CM

## 2024-12-19 DIAGNOSIS — J84.9 CHRONIC INTERSTITIAL LUNG DISEASE: ICD-10-CM

## 2024-12-19 DIAGNOSIS — D64.9 ANEMIA, UNSPECIFIED TYPE: ICD-10-CM

## 2024-12-19 DIAGNOSIS — J31.0 CHRONIC RHINITIS: ICD-10-CM

## 2024-12-19 PROCEDURE — 1125F AMNT PAIN NOTED PAIN PRSNT: CPT | Mod: CPTII,S$GLB,, | Performed by: NURSE PRACTITIONER

## 2024-12-19 PROCEDURE — 3078F DIAST BP <80 MM HG: CPT | Mod: CPTII,S$GLB,, | Performed by: NURSE PRACTITIONER

## 2024-12-19 PROCEDURE — 99214 OFFICE O/P EST MOD 30 MIN: CPT | Mod: S$GLB,,, | Performed by: NURSE PRACTITIONER

## 2024-12-19 PROCEDURE — 1159F MED LIST DOCD IN RCRD: CPT | Mod: CPTII,S$GLB,, | Performed by: NURSE PRACTITIONER

## 2024-12-19 PROCEDURE — 3074F SYST BP LT 130 MM HG: CPT | Mod: CPTII,S$GLB,, | Performed by: NURSE PRACTITIONER

## 2024-12-19 PROCEDURE — 99999 PR PBB SHADOW E&M-EST. PATIENT-LVL IV: CPT | Mod: PBBFAC,,, | Performed by: NURSE PRACTITIONER

## 2024-12-19 PROCEDURE — 1160F RVW MEDS BY RX/DR IN RCRD: CPT | Mod: CPTII,S$GLB,, | Performed by: NURSE PRACTITIONER

## 2024-12-19 PROCEDURE — 1101F PT FALLS ASSESS-DOCD LE1/YR: CPT | Mod: CPTII,S$GLB,, | Performed by: NURSE PRACTITIONER

## 2024-12-19 PROCEDURE — 3288F FALL RISK ASSESSMENT DOCD: CPT | Mod: CPTII,S$GLB,, | Performed by: NURSE PRACTITIONER

## 2024-12-19 RX ORDER — AZELASTINE 1 MG/ML
1 SPRAY, METERED NASAL 2 TIMES DAILY
Qty: 30 ML | Refills: 2 | Status: SHIPPED | OUTPATIENT
Start: 2024-12-19

## 2024-12-19 RX ORDER — LEVOTHYROXINE SODIUM 75 UG/1
TABLET ORAL
COMMUNITY
Start: 2024-08-15

## 2024-12-19 RX ORDER — AZITHROMYCIN 250 MG/1
TABLET, FILM COATED ORAL
Qty: 6 TABLET | Refills: 0 | Status: SHIPPED | OUTPATIENT
Start: 2024-12-19 | End: 2024-12-24

## 2024-12-20 PROBLEM — M05.80 SEROPOSITIVE EROSIVE RHEUMATOID ARTHRITIS: Status: ACTIVE | Noted: 2024-12-20

## 2024-12-20 PROBLEM — M06.30: Status: ACTIVE | Noted: 2024-12-20

## 2024-12-20 PROBLEM — Z79.899 HIGH RISK MEDICATION USE: Status: ACTIVE | Noted: 2024-12-20

## 2024-12-20 NOTE — PROGRESS NOTES
Subjective:       Patient ID: Anitra Fermin is a 79 y.o. female.    Chief Complaint: Follow-up (4 week)     Anitra Fermin presents to the clinic today for follow up     Recently moved from [facility] to assist her sister who was on Hospice, but has since passed.    Was under the care of PCP: Yaron Contreras    Patient Active Problem List  Diagnosis  ·           Age-related osteoporosis without current pathological fracture  ·           Major depression, single episode  ·           Mixed hyperlipidemia  ·           Peripheral vertigo  ·           Restless legs syndrome  ·           Hypothyroidism  ·           Chronic interstitial lung disease  ·           Personal history of rheumatoid arthritis  ·           Anemia  ·           Situational anxiety        Interstitial lung disease  Denies history of asthma. Was a smoker > 40 years ago. Takes singulair, Home she is living in has 2 cats, a smoker, large amount of oak,pine trees.  Reports recent increase in nasal congestion, chest congestion, productive cough      Was recently seen for increased leg swelling  HCTZ was added to regimen- reports she is taking daily   Has noticed an improvement in overall swelling, still has some residual to bilateral ankles. L> R.  Hx of closed fx of left hip following accidental fall 5/2023  Ambulates with cane.           Review of Systems    Patient Active Problem List   Diagnosis    Age-related osteoporosis without current pathological fracture    Major depression, single episode    Mixed hyperlipidemia    Peripheral vertigo    Restless legs syndrome    Hypothyroidism    Chronic interstitial lung disease    Personal history of rheumatoid arthritis    Anemia    Situational anxiety    Nodule, rheumatoid    Seropositive erosive rheumatoid arthritis    High risk medication use       Objective:      Physical Exam  Vitals and nursing note reviewed.   Constitutional:       General: She is not in acute distress.     Appearance:  Normal appearance. She is not ill-appearing.   HENT:      Right Ear: Tympanic membrane is bulging.      Left Ear: Tympanic membrane is bulging.      Nose: Mucosal edema and congestion present.      Right Sinus: Maxillary sinus tenderness present.      Left Sinus: Maxillary sinus tenderness present.      Mouth/Throat:      Lips: Pink.      Pharynx: Oropharynx is clear. Uvula midline. Posterior oropharyngeal erythema present.   Eyes:      Conjunctiva/sclera: Conjunctivae normal.   Cardiovascular:      Rate and Rhythm: Normal rate and regular rhythm.      Heart sounds: No murmur heard.  Pulmonary:      Effort: Pulmonary effort is normal. No respiratory distress.      Breath sounds: Wheezing (occasional) present.   Musculoskeletal:      Right lower leg: No edema (trace).      Left lower leg: No edema (trace).   Lymphadenopathy:      Cervical: No cervical adenopathy.   Skin:     General: Skin is warm and dry.      Coloration: Skin is pale.   Neurological:      Mental Status: She is alert and oriented to person, place, and time.   Psychiatric:         Mood and Affect: Mood normal.         Behavior: Behavior normal.         Lab Results   Component Value Date    WBC 6.79 11/22/2024    HGB 10.6 (L) 11/22/2024    HCT 34.5 (L) 11/22/2024     11/22/2024    CHOL 172 01/25/2024    TRIG 216 (H) 01/25/2024    HDL 50 01/25/2024    ALT 13 11/22/2024    AST 19 11/22/2024     11/22/2024    K 4.2 11/22/2024     11/22/2024    CREATININE 0.7 11/22/2024    BUN 11 11/22/2024    CO2 25 11/22/2024    TSH 3.936 11/22/2024     The 10-year ASCVD risk score (Shad PEREZ, et al., 2019) is: 29.3%    Values used to calculate the score:      Age: 79 years      Sex: Female      Is Non- : No      Diabetic: No      Tobacco smoker: No      Systolic Blood Pressure: 124 mmHg      Is BP treated: Yes      HDL Cholesterol: 50 mg/dL      Total Cholesterol: 172 mg/dL  Visit Vitals  /76 (BP Location: Left arm,  "Patient Position: Sitting)   Pulse 73   Resp 16   Ht 5' 4" (1.626 m)   Wt 65.3 kg (143 lb 14.4 oz)   SpO2 (!) 94%   BMI 24.70 kg/m²      Assessment:       1. Essential hypertension    2. Chronic interstitial lung disease    3. Chronic rhinitis    4. Anemia, unspecified type        Plan:       1. Essential hypertension  Continue current medication regimen  The patient was counseled on HTN education, management and recommendations. The need for weight reduction was reinforced and a BMI goal of 19 to 25 was set.  Patient was encouraged to adhere to a low sodium diet and a DASH diet was recommended. Patient was also encouraged to engage in routine exercise such as walking most days of the week greater than 30 minutes. Patient education materials were provided to the patient for home review and further reinforcement of topics discussed.     2. Chronic interstitial lung disease  -     azithromycin (Z-DEQUAN) 250 MG tablet; Take 2 tablets by mouth on day 1; Take 1 tablet by mouth on days 2-5  Dispense: 6 tablet; Refill: 0    3. Chronic rhinitis  -     azelastine (ASTELIN) 137 mcg (0.1 %) nasal spray; 1 spray (137 mcg total) by Nasal route 2 (two) times daily.  Dispense: 30 mL; Refill: 2  Continue singulair  Discussed adding saline nasal rinses/flushes at least daily   Flush sinuses, blow nose prior to nasal spray use.   4. Anemia, unspecified type  Taking geritol      Follow up in about 3 months (around 3/19/2025).      Future Appointments       Date Provider Specialty Appt Notes    3/21/2025 Liz Minor MD Family Medicine 3 month f/u for anemia/HTN             "

## 2025-02-05 DIAGNOSIS — J84.9 CHRONIC INTERSTITIAL LUNG DISEASE: ICD-10-CM

## 2025-02-05 DIAGNOSIS — M81.0 AGE-RELATED OSTEOPOROSIS WITHOUT CURRENT PATHOLOGICAL FRACTURE: ICD-10-CM

## 2025-02-05 DIAGNOSIS — E78.2 MIXED HYPERLIPIDEMIA: ICD-10-CM

## 2025-02-05 DIAGNOSIS — R03.0 ELEVATED BLOOD PRESSURE READING: ICD-10-CM

## 2025-02-05 DIAGNOSIS — G25.81 RESTLESS LEGS SYNDROME: ICD-10-CM

## 2025-02-05 DIAGNOSIS — J31.0 CHRONIC RHINITIS: ICD-10-CM

## 2025-02-05 DIAGNOSIS — M25.50 POLYARTHRALGIA: ICD-10-CM

## 2025-02-05 DIAGNOSIS — M79.89 LEG SWELLING: ICD-10-CM

## 2025-02-05 DIAGNOSIS — Z87.39 PERSONAL HISTORY OF RHEUMATOID ARTHRITIS: ICD-10-CM

## 2025-02-05 NOTE — TELEPHONE ENCOUNTER
Refill Routing Note   Medication(s) are not appropriate for processing by [facility] for the following reason(s):        Non-participating provider    [facility] action(s):  Route               Appointments  past 12m or future 3m with PCP    Date Provider   Last Visit   12/19/2024 Garima Gonzalez NP   Next Visit   Visit date not found Garima Gonzalez NP   ED visits in past 90 days: 0        Note composed:4:31 PM 02/05/2025

## 2025-02-07 RX ORDER — MONTELUKAST SODIUM 10 MG/1
10 TABLET ORAL NIGHTLY
Qty: 90 TABLET | Refills: 3 | Status: SHIPPED | OUTPATIENT
Start: 2025-02-07

## 2025-02-07 RX ORDER — HYDROCHLOROTHIAZIDE 12.5 MG/1
12.5 TABLET ORAL DAILY
Qty: 90 TABLET | Refills: 1 | Status: SHIPPED | OUTPATIENT
Start: 2025-02-07

## 2025-02-07 RX ORDER — ROPINIROLE 0.5 MG/1
0.5 TABLET, FILM COATED ORAL NIGHTLY
Qty: 90 TABLET | Refills: 1 | Status: SHIPPED | OUTPATIENT
Start: 2025-02-07

## 2025-02-07 RX ORDER — AZELASTINE 1 MG/ML
1 SPRAY, METERED NASAL 2 TIMES DAILY
Qty: 30 ML | Refills: 2 | Status: SHIPPED | OUTPATIENT
Start: 2025-02-07

## 2025-02-07 RX ORDER — IBUPROFEN 800 MG/1
800 TABLET ORAL EVERY 8 HOURS PRN
Qty: 90 TABLET | Refills: 1 | Status: SHIPPED | OUTPATIENT
Start: 2025-02-07

## 2025-02-07 RX ORDER — ALENDRONATE SODIUM 70 MG/1
70 TABLET ORAL
Qty: 12 TABLET | Refills: 3 | Status: SHIPPED | OUTPATIENT
Start: 2025-02-07

## 2025-02-07 RX ORDER — ROSUVASTATIN CALCIUM 10 MG/1
10 TABLET, COATED ORAL DAILY
Qty: 90 TABLET | Refills: 3 | Status: SHIPPED | OUTPATIENT
Start: 2025-02-07

## 2025-02-20 ENCOUNTER — TELEPHONE (OUTPATIENT)
Dept: FAMILY MEDICINE | Facility: CLINIC | Age: 80
End: 2025-02-20
Payer: MEDICARE

## 2025-02-20 ENCOUNTER — NURSE TRIAGE (OUTPATIENT)
Dept: ADMINISTRATIVE | Facility: CLINIC | Age: 80
End: 2025-02-20
Payer: MEDICARE

## 2025-02-20 NOTE — TELEPHONE ENCOUNTER
Pt stated she has a cold and a sore throat. Nasal congestion. Cough. Symptoms started yesterday. Earache. Care advice recommends pt see MD today. Appt given.  Reason for Disposition   Earache    Additional Information   Negative: SEVERE difficulty breathing (e.g., struggling for each breath, speaks in single words)   Negative: Sounds like a life-threatening emergency to the triager   Negative: Fever > 104 F (40 C)   Negative: Patient sounds very sick or weak to the triager   Negative: [1] Fever > 101 F (38.3 C) AND [2] age > 60 years   Negative: [1] Fever > 100 F (37.8 C) AND [2] bedridden (e.g., CVA, chronic illness, recovering from surgery)   Negative: [1] Fever > 100 F (37.8 C) AND [2] diabetes mellitus or weak immune system (e.g., HIV positive, cancer chemo, splenectomy, organ transplant, chronic steroids)   Negative: Fever present > 3 days (72 hours)   Negative: [1] Fever returns after gone for over 24 hours AND [2] symptoms worse or not improved   Negative: [1] Sinus pain (not just congestion) AND [2] fever    Protocols used: Common Cold-A-AH     Acitretin Pregnancy And Lactation Text: This medication is Pregnancy Category X and should not be given to women who are pregnant or may become pregnant in the future. This medication is excreted in breast milk.

## 2025-02-20 NOTE — TELEPHONE ENCOUNTER
Spoke with patient, she wanted me to put in a message to request some conventional medicines for a cold. I informed her that if she feels it urgent, she should visit her nearest urgent care.

## 2025-02-20 NOTE — TELEPHONE ENCOUNTER
Spoke w/ pt  Offered to schedule virtual w/ different provider d/t NP out of sick. Pt refused and will contact office back if she wishes to r/s.

## 2025-02-21 ENCOUNTER — OFFICE VISIT (OUTPATIENT)
Dept: FAMILY MEDICINE | Facility: CLINIC | Age: 80
End: 2025-02-21
Payer: MEDICARE

## 2025-02-21 ENCOUNTER — RESULTS FOLLOW-UP (OUTPATIENT)
Dept: FAMILY MEDICINE | Facility: CLINIC | Age: 80
End: 2025-02-21

## 2025-02-21 ENCOUNTER — HOSPITAL ENCOUNTER (OUTPATIENT)
Dept: RADIOLOGY | Facility: HOSPITAL | Age: 80
Discharge: HOME | End: 2025-02-21
Payer: MEDICARE

## 2025-02-21 VITALS
BODY MASS INDEX: 24.39 KG/M2 | HEART RATE: 77 BPM | RESPIRATION RATE: 16 BRPM | HEIGHT: 64 IN | SYSTOLIC BLOOD PRESSURE: 138 MMHG | OXYGEN SATURATION: 90 % | WEIGHT: 142.88 LBS | DIASTOLIC BLOOD PRESSURE: 78 MMHG | TEMPERATURE: 99 F

## 2025-02-21 DIAGNOSIS — R09.89 CHEST CONGESTION: ICD-10-CM

## 2025-02-21 DIAGNOSIS — J20.9 ACUTE BRONCHITIS, UNSPECIFIED ORGANISM: ICD-10-CM

## 2025-02-21 DIAGNOSIS — J02.9 SORE THROAT: ICD-10-CM

## 2025-02-21 DIAGNOSIS — J02.9 SORE THROAT: Primary | ICD-10-CM

## 2025-02-21 DIAGNOSIS — R05.9 COUGH, UNSPECIFIED TYPE: ICD-10-CM

## 2025-02-21 DIAGNOSIS — R06.02 SHORTNESS OF BREATH: ICD-10-CM

## 2025-02-21 PROBLEM — T78.40XA ALLERGIC REACTION: Status: ACTIVE | Noted: 2025-02-21

## 2025-02-21 PROCEDURE — 71046 X-RAY EXAM CHEST 2 VIEWS: CPT | Mod: TC,PN

## 2025-02-21 RX ORDER — ALBUTEROL SULFATE 90 UG/1
1-2 INHALANT RESPIRATORY (INHALATION) EVERY 4 HOURS PRN
Qty: 8 G | Refills: 0 | Status: SHIPPED | OUTPATIENT
Start: 2025-02-21 | End: 2026-02-21

## 2025-02-21 RX ORDER — BENZONATATE 100 MG/1
100 CAPSULE ORAL 3 TIMES DAILY PRN
Qty: 30 CAPSULE | Refills: 0 | Status: SHIPPED | OUTPATIENT
Start: 2025-02-21 | End: 2025-03-03

## 2025-02-21 RX ORDER — GUAIFENESIN 600 MG/1
600 TABLET, EXTENDED RELEASE ORAL 2 TIMES DAILY
Qty: 14 TABLET | Refills: 0 | Status: SHIPPED | OUTPATIENT
Start: 2025-02-21

## 2025-02-21 RX ORDER — DOXYCYCLINE 100 MG/1
100 CAPSULE ORAL EVERY 12 HOURS
Qty: 20 CAPSULE | Refills: 10 | Status: SHIPPED | OUTPATIENT
Start: 2025-02-21

## 2025-02-21 NOTE — ASSESSMENT & PLAN NOTE
POCT Flu, covid and strep- all negative in clinic   Has albuterol inhaler-encouraged to use PRN   Expiratory Wheezing noted to right upper lobe  Chest xray order for review

## 2025-02-22 NOTE — PROGRESS NOTES
I have reviewed the notes, assessments, and/or procedures performed by Tressa Cam NP, I concur with her/his documentation of Anitra Fermin.  Monitor for need for steroids

## 2025-07-12 ENCOUNTER — HOSPITAL ENCOUNTER (EMERGENCY)
Facility: HOSPITAL | Age: 80
Discharge: HOME | End: 2025-07-13
Attending: EMERGENCY MEDICINE
Payer: MEDICARE

## 2025-07-12 VITALS
SYSTOLIC BLOOD PRESSURE: 208 MMHG | WEIGHT: 140 LBS | HEART RATE: 86 BPM | HEIGHT: 64 IN | TEMPERATURE: 97 F | OXYGEN SATURATION: 94 % | DIASTOLIC BLOOD PRESSURE: 90 MMHG | RESPIRATION RATE: 18 BRPM | BODY MASS INDEX: 23.9 KG/M2
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
## 2025-07-12 DIAGNOSIS — S42.294A OTHER CLOSED NONDISPLACED FRACTURE OF PROXIMAL END OF RIGHT HUMERUS, INITIAL ENCOUNTER: Primary | ICD-10-CM

## 2025-07-12 DIAGNOSIS — M79.601 RIGHT ARM PAIN: ICD-10-CM

## 2025-07-12 DIAGNOSIS — M25.011 HEMARTHROSIS OF RIGHT SHOULDER: ICD-10-CM

## 2025-07-12 PROCEDURE — 73060 X-RAY EXAM OF HUMERUS: CPT | Mod: 26,RT,, | Performed by: RADIOLOGY

## 2025-07-12 PROCEDURE — 99284 EMERGENCY DEPT VISIT MOD MDM: CPT | Mod: 25

## 2025-07-12 PROCEDURE — 63600175 PHARM REV CODE 636 W HCPCS: Performed by: NURSE PRACTITIONER

## 2025-07-12 PROCEDURE — 96375 TX/PRO/DX INJ NEW DRUG ADDON: CPT

## 2025-07-12 PROCEDURE — 63600175 PHARM REV CODE 636 W HCPCS: Performed by: EMERGENCY MEDICINE

## 2025-07-12 PROCEDURE — 73030 X-RAY EXAM OF SHOULDER: CPT | Mod: 26,RT,, | Performed by: RADIOLOGY

## 2025-07-12 PROCEDURE — 73060 X-RAY EXAM OF HUMERUS: CPT | Mod: TC,RT

## 2025-07-12 PROCEDURE — 25000003 PHARM REV CODE 250: Performed by: NURSE PRACTITIONER

## 2025-07-12 PROCEDURE — 96374 THER/PROPH/DIAG INJ IV PUSH: CPT

## 2025-07-12 PROCEDURE — 73030 X-RAY EXAM OF SHOULDER: CPT | Mod: TC,RT

## 2025-07-12 RX ORDER — MORPHINE SULFATE 2 MG/ML
4 INJECTION, SOLUTION INTRAMUSCULAR; INTRAVENOUS
Refills: 0 | Status: COMPLETED | OUTPATIENT
Start: 2025-07-12 | End: 2025-07-12

## 2025-07-12 RX ORDER — HYDROMORPHONE HYDROCHLORIDE 1 MG/ML
1 INJECTION, SOLUTION INTRAMUSCULAR; INTRAVENOUS; SUBCUTANEOUS
Refills: 0 | Status: COMPLETED | OUTPATIENT
Start: 2025-07-12 | End: 2025-07-12

## 2025-07-12 RX ORDER — KETOROLAC TROMETHAMINE 30 MG/ML
15 INJECTION, SOLUTION INTRAMUSCULAR; INTRAVENOUS
Status: COMPLETED | OUTPATIENT
Start: 2025-07-12 | End: 2025-07-12

## 2025-07-12 RX ORDER — OXYCODONE AND ACETAMINOPHEN 5; 325 MG/1; MG/1
1 TABLET ORAL EVERY 4 HOURS PRN
Qty: 21 TABLET | Refills: 0 | Status: SHIPPED | OUTPATIENT
Start: 2025-07-12 | End: 2025-07-19

## 2025-07-12 RX ORDER — ONDANSETRON 4 MG/1
8 TABLET, ORALLY DISINTEGRATING ORAL
Status: COMPLETED | OUTPATIENT
Start: 2025-07-12 | End: 2025-07-12

## 2025-07-12 RX ADMIN — MORPHINE SULFATE 4 MG: 2 INJECTION, SOLUTION INTRAMUSCULAR; INTRAVENOUS at 11:07

## 2025-07-12 RX ADMIN — ONDANSETRON 8 MG: 4 TABLET, ORALLY DISINTEGRATING ORAL at 10:07

## 2025-07-12 RX ADMIN — KETOROLAC TROMETHAMINE 15 MG: 30 INJECTION, SOLUTION INTRAMUSCULAR; INTRAVENOUS at 11:07

## 2025-07-12 RX ADMIN — HYDROMORPHONE HYDROCHLORIDE 1 MG: 1 INJECTION, SOLUTION INTRAMUSCULAR; INTRAVENOUS; SUBCUTANEOUS at 10:07

## 2025-07-13 NOTE — ED PROVIDER NOTES
Encounter Date: 7/12/2025       History     Chief Complaint   Patient presents with    Arm Injury     Presents with R upper arm pain after a fall approximately 30 minutes ago. The patient was kicking a cat away and lost balance, landing on the R side of the body. (-) head strike and no blood thinners noted. The patient has not taken any meds for pain. RUE skin WDL, limited ROM to RUE     Right upper arm and shoulder pain. Fell when tripped over cat. Happened prior to arrival. Has history and is being treated for RA    The history is provided by the patient.   Arm Injury   The incident occurred just prior to arrival. The incident occurred at home. Injury mechanism: tripped over cat. There is an injury to the Right shoulder and right upper arm. She reports no foreign bodies present. Pertinent negatives include no numbness, no nausea, no vomiting, no loss of consciousness and no weakness.     Review of patient's allergies indicates:   Allergen Reactions    Pork/porcine containing products Hives    Sheep/ovine/lamb containing products     Sulfa (sulfonamide antibiotics) Other (See Comments)    Beef containing products Rash     Past Medical History:   Diagnosis Date    Arthritis     Restless legs     Shingles      Past Surgical History:   Procedure Laterality Date    AUGMENTATION OF BREAST Bilateral      No family history on file.  Social History[1]  Review of Systems   Constitutional:  Negative for fever.   Gastrointestinal:  Negative for nausea and vomiting.   Musculoskeletal:  Positive for arthralgias.   Neurological:  Negative for tremors, loss of consciousness, weakness and numbness.       Physical Exam     Initial Vitals [07/12/25 2045]   BP Pulse Resp Temp SpO2   (!) 221/91 76 20 97.4 °F (36.3 °C) (!) 94 %      MAP       --         Physical Exam    Nursing note and vitals reviewed.  Constitutional: She appears well-developed and well-nourished.   HENT:   Head: Normocephalic and atraumatic.   Eyes: EOM are normal.    Neck: Neck supple.   Cardiovascular:  Normal rate and regular rhythm.           Pulmonary/Chest: Breath sounds normal. She has no wheezes.   Abdominal: Abdomen is soft. There is no abdominal tenderness.   Musculoskeletal:      Cervical back: Neck supple.      Comments: Pain on ROM of right shoulder and upper arm. Distal pulses strong. Swollen, DIP and PIP joints with contractures     Neurological: She is alert and oriented to person, place, and time.   Skin: Skin is warm and dry. Capillary refill takes less than 2 seconds.   Psychiatric: She has a normal mood and affect.         ED Course   Procedures  Labs Reviewed - No data to display       Imaging Results              X-Ray Humerus 2 View Right (Final result)  Result time 07/12/25 22:53:02      Final result by Bobby Medeiros DO (07/12/25 22:53:02)                   Impression:      Fracture of the right proximal humerus as above.      Electronically signed by: Bobby Medeiros  Date:    07/12/2025  Time:    22:53               Narrative:    EXAMINATION:  XR SHOULDER TRAUMA 3 VIEW RIGHT; XR HUMERUS 2 VIEW RIGHT    CLINICAL HISTORY:  Unspecified fall, initial encounter; Pain in right arm    TECHNIQUE:  Three or four radiographs of the right shoulder.    AP and lateral radiographs of the right humerus.    COMPARISON:  None    FINDINGS:  There is diffuse osteopenia.    Right shoulder: There is an acute, minimally displaced fracture of the right proximal humerus, involving the surgical neck and the greater tuberosity.  No definite extension to the humeral head is definitively seen.  The humeral head is inferiorly displaced with respect to the glenoid, compatible with a large glenohumeral joint hemarthrosis.  No additional fractures are seen.  There are degenerative changes.    Right humerus: Right humerus fracture as detailed above.  No acute fracture or dislocation of the mid or distal humerus.                                       X-Ray Shoulder Trauma Right  (Final result)  Result time 07/12/25 22:53:02      Final result by Bobby Medeiros DO (07/12/25 22:53:02)                   Impression:      Fracture of the right proximal humerus as above.      Electronically signed by: Bobby Medeiros  Date:    07/12/2025  Time:    22:53               Narrative:    EXAMINATION:  XR SHOULDER TRAUMA 3 VIEW RIGHT; XR HUMERUS 2 VIEW RIGHT    CLINICAL HISTORY:  Unspecified fall, initial encounter; Pain in right arm    TECHNIQUE:  Three or four radiographs of the right shoulder.    AP and lateral radiographs of the right humerus.    COMPARISON:  None    FINDINGS:  There is diffuse osteopenia.    Right shoulder: There is an acute, minimally displaced fracture of the right proximal humerus, involving the surgical neck and the greater tuberosity.  No definite extension to the humeral head is definitively seen.  The humeral head is inferiorly displaced with respect to the glenoid, compatible with a large glenohumeral joint hemarthrosis.  No additional fractures are seen.  There are degenerative changes.    Right humerus: Right humerus fracture as detailed above.  No acute fracture or dislocation of the mid or distal humerus.                                       Medications   HYDROmorphone injection 1 mg (1 mg Intravenous Given 7/12/25 2240)   ondansetron disintegrating tablet 8 mg (8 mg Oral Given 7/12/25 2240)   morphine injection 4 mg (4 mg Intravenous Given 7/12/25 2330)   ketorolac injection 15 mg (15 mg Intravenous Given 7/12/25 2332)     Medical Decision Making  80-year-old female with known history of rheumatoid arthritis.  Comes in with right shoulder and upper arm pain.  States she fell when she tripped over a cat that got in her way.  Happened prior to arrival.      Differential diagnoses: Contusion, fracture, dislocation, rheumatoid arthritic pain    Amount and/or Complexity of Data Reviewed  Radiology: ordered. Decision-making details documented in ED  Course.    Risk  Prescription drug management.               ED Course as of 07/13/25 0113   Sat Jul 12, 2025 2240 Signed off to Dr Edwards at the end of my shift [NP]      ED Course User Index  [NP] Marianela Pinon FNP           Hemarthrosis without dislocation per radiology. Patient placed in sling/swathe, rest/ice, follow up with orthopedics.            Patient's evaluation in the ED does not suggest any emergent or life-threatening medical conditions requiring immediate intervention beyond what was provided in the ED, and I believe patient is safe for discharge. Regardless, an unremarkable evaluation in the ED does not preclude the development or presence of a serious or life-threatening condition. As such, patient was given return instructions for any change or worsening of symptoms.          Clinical Impression:  Final diagnoses:  [W19.XXXA] Fall  [M79.601] Right arm pain  [M25.011] Hemarthrosis of right shoulder  [S42.294A] Other closed nondisplaced fracture of proximal end of right humerus, initial encounter (Primary)          ED Disposition Condition    Discharge Stable          ED Prescriptions       Medication Sig Dispense Start Date End Date Auth. Provider    oxyCODONE-acetaminophen (PERCOCET) 5-325 mg per tablet Take 1 tablet by mouth every 4 (four) hours as needed for Pain. 21 tablet 7/12/2025 7/19/2025 Chuck Edwards MD          Follow-up Information       Follow up With Specialties Details Why Contact Info    Luis Aquino MD Orthopedic Surgery Schedule an appointment as soon as possible for a visit   [address] [facility] [place] [address]  [phone]      Garima Gonzalez NP Family Medicine  As needed [address] [facility] [place] [address]  [phone]      [facility] Emergency Medicine  As needed, If symptoms worsen [address] [address] [phone]                   Marianela Pinon FNP  07/12/25 2240        [1]   Social History  Tobacco Use    Smoking status: Former     Current packs/day: 0.00     Types: Cigarettes     Quit date: 1/15/1996     Years since quittin.5    Smokeless tobacco: Never   Substance Use Topics    Alcohol use: Never    Drug use: Never        Chuck Edwards MD  25 0114

## 2025-07-17 ENCOUNTER — OFFICE VISIT (OUTPATIENT)
Dept: ORTHOPEDICS | Facility: CLINIC | Age: 80
End: 2025-07-17
Payer: MEDICARE

## 2025-07-17 ENCOUNTER — TELEPHONE (OUTPATIENT)
Dept: FAMILY MEDICINE | Facility: CLINIC | Age: 80
End: 2025-07-17
Payer: MEDICARE

## 2025-07-17 VITALS — BODY MASS INDEX: 23.34 KG/M2 | WEIGHT: 136.69 LBS | HEIGHT: 64 IN

## 2025-07-17 DIAGNOSIS — S42.294A OTHER CLOSED NONDISPLACED FRACTURE OF PROXIMAL END OF RIGHT HUMERUS, INITIAL ENCOUNTER: Primary | ICD-10-CM

## 2025-07-17 DIAGNOSIS — S42.294A OTHER CLOSED NONDISPLACED FRACTURE OF PROXIMAL END OF RIGHT HUMERUS, INITIAL ENCOUNTER: ICD-10-CM

## 2025-07-17 PROCEDURE — 1159F MED LIST DOCD IN RCRD: CPT | Mod: CPTII,S$GLB,, | Performed by: ORTHOPAEDIC SURGERY

## 2025-07-17 PROCEDURE — 1100F PTFALLS ASSESS-DOCD GE2>/YR: CPT | Mod: CPTII,S$GLB,, | Performed by: ORTHOPAEDIC SURGERY

## 2025-07-17 PROCEDURE — 1125F AMNT PAIN NOTED PAIN PRSNT: CPT | Mod: CPTII,S$GLB,, | Performed by: ORTHOPAEDIC SURGERY

## 2025-07-17 PROCEDURE — 1160F RVW MEDS BY RX/DR IN RCRD: CPT | Mod: CPTII,S$GLB,, | Performed by: ORTHOPAEDIC SURGERY

## 2025-07-17 PROCEDURE — 99999 PR PBB SHADOW E&M-EST. PATIENT-LVL III: CPT | Mod: PBBFAC,,, | Performed by: ORTHOPAEDIC SURGERY

## 2025-07-17 PROCEDURE — 3288F FALL RISK ASSESSMENT DOCD: CPT | Mod: CPTII,S$GLB,, | Performed by: ORTHOPAEDIC SURGERY

## 2025-07-17 PROCEDURE — 99204 OFFICE O/P NEW MOD 45 MIN: CPT | Mod: S$GLB,,, | Performed by: ORTHOPAEDIC SURGERY

## 2025-07-17 NOTE — TELEPHONE ENCOUNTER
----- Message from Prabhakar Smith sent at 7/17/2025 10:38 AM CDT -----  Regarding: prescription refill and follow up  Patient needs an appointment for her prescription refill and a follow up. Call patient at [phone].

## 2025-07-17 NOTE — PROGRESS NOTES
Subjective:      Patient ID: Anitra Fermin is a 80 y.o. female.    Chief Complaint: Pain and Injury of the Right Shoulder (Fall - DOI: 07/12/2025- fell over a stray cat . )    HPI  80-year-old female one-week history of right shoulder pain.  She sustained accidental blunt trauma during a fall when she tripped over an animal.  Was seen in the emergency department placed into a shoulder sling/immobilizer referred for further evaluation.  Denies any other complaints or prior problems with the shoulder pain has improved a bit with relative rest and immobilization  ROS      Objective:    Ortho Exam     Constitutional:   Patient is alert  and oriented in no acute distress  HEENT:  normocephalic atraumatic; PERRL EOMI  Neck:  Supple without adenopathy  Cardiovascular:  Normal rate and rhythm  Pulmonary:  Normal respiratory effort normal chest wall expansion  Abdominal:  Nonprotuberant nondistended  Musculoskeletal:  Patient has some resolving ecchymosis diffuse tenderness of the right shoulder  She has a arthritic changes of the hands intact skin, sensation, and brisk capillary refill of the digits  Neurological:  No focal defect; cranial nerves 2-12 grossly intact  Psychiatric/behavioral:  Mood and behavior normal      X-Ray Humerus 2 View Right  Narrative: EXAMINATION:  XR SHOULDER TRAUMA 3 VIEW RIGHT; XR HUMERUS 2 VIEW RIGHT    CLINICAL HISTORY:  Unspecified fall, initial encounter; Pain in right arm    TECHNIQUE:  Three or four radiographs of the right shoulder.    AP and lateral radiographs of the right humerus.    COMPARISON:  None    FINDINGS:  There is diffuse osteopenia.    Right shoulder: There is an acute, minimally displaced fracture of the right proximal humerus, involving the surgical neck and the greater tuberosity.  No definite extension to the humeral head is definitively seen.  The humeral head is inferiorly displaced with respect to the glenoid, compatible with a large glenohumeral joint hemarthrosis.  No  additional fractures are seen.  There are degenerative changes.    Right humerus: Right humerus fracture as detailed above.  No acute fracture or dislocation of the mid or distal humerus.  Impression: Fracture of the right proximal humerus as above.    Electronically signed by: Bobby Medeiros  Date:    07/12/2025  Time:    22:53  X-Ray Shoulder Trauma Right  Narrative: EXAMINATION:  XR SHOULDER TRAUMA 3 VIEW RIGHT; XR HUMERUS 2 VIEW RIGHT    CLINICAL HISTORY:  Unspecified fall, initial encounter; Pain in right arm    TECHNIQUE:  Three or four radiographs of the right shoulder.    AP and lateral radiographs of the right humerus.    COMPARISON:  None    FINDINGS:  There is diffuse osteopenia.    Right shoulder: There is an acute, minimally displaced fracture of the right proximal humerus, involving the surgical neck and the greater tuberosity.  No definite extension to the humeral head is definitively seen.  The humeral head is inferiorly displaced with respect to the glenoid, compatible with a large glenohumeral joint hemarthrosis.  No additional fractures are seen.  There are degenerative changes.    Right humerus: Right humerus fracture as detailed above.  No acute fracture or dislocation of the mid or distal humerus.  Impression: Fracture of the right proximal humerus as above.    Electronically signed by: Bobby Medeiros  Date:    07/12/2025  Time:    22:53       My Radiographs Findings:    Radiographs of the right shoulder show proximal humerus fracture mild displaced some slight inferior glenohumeral subluxation  Assessment:       Encounter Diagnosis   Name Primary?    Other closed nondisplaced fracture of proximal end of right humerus, initial encounter          Plan:       I have discussed medical condition and treatment options with her at length.  Begin immediate elbow range of motion exercises may begin pendulum exercises soon as she is comfortable doing so.  We have discussed sling/shoulder immobilization  otherwise and follow up radiographs in 3-4 weeks sooner if any questions or problems        Past Medical History:   Diagnosis Date    Arthritis     Restless legs     Shingles      Past Surgical History:   Procedure Laterality Date    AUGMENTATION OF BREAST Bilateral        Current Medications[1]    Review of patient's allergies indicates:   Allergen Reactions    Pork/porcine containing products Hives    Sheep/ovine/lamb containing products     Sulfa (sulfonamide antibiotics) Other (See Comments)    Beef containing products Rash       No family history on file.  Social History     Occupational History    Not on file   Tobacco Use    Smoking status: Former     Current packs/day: 0.00     Types: Cigarettes     Quit date: 1/15/1996     Years since quittin.5     Passive exposure: Past    Smokeless tobacco: Never   Substance and Sexual Activity    Alcohol use: Never    Drug use: Never    Sexual activity: Not Currently     Partners: Male            [1]   Current Outpatient Medications:     albuterol (PROVENTIL/VENTOLIN HFA) 90 mcg/actuation inhaler, Inhale 1-2 puffs into the lungs every 4 (four) hours as needed for Wheezing or Shortness of Breath. Rescue, Disp: 8 g, Rfl: 0    alendronate (FOSAMAX) 70 MG tablet, Take 1 tablet (70 mg total) by mouth every 7 days., Disp: 12 tablet, Rfl: 3    azelastine (ASTELIN) 137 mcg (0.1 %) nasal spray, 1 spray (137 mcg total) by Nasal route 2 (two) times daily., Disp: 30 mL, Rfl: 2    doxycycline (VIBRAMYCIN) 100 MG Cap, Take 1 capsule (100 mg total) by mouth every 12 (twelve) hours., Disp: 20 capsule, Rfl: 10    EPINEPHrine (EPIPEN) 0.3 mg/0.3 mL AtIn, SMARTSIG:Milliliter(s) IM, Disp: , Rfl:     guaiFENesin (MUCINEX) 600 mg 12 hr tablet, Take 1 tablet (600 mg total) by mouth 2 (two) times daily., Disp: 14 tablet, Rfl: 0    hydroCHLOROthiazide 12.5 MG Tab, Take 1 tablet (12.5 mg total) by mouth once daily., Disp: 90 tablet, Rfl: 1    ibuprofen (IBU) 800 MG tablet, Take 1 tablet (800  mg total) by mouth every 8 (eight) hours as needed for Pain., Disp: 90 tablet, Rfl: 1    leflunomide (ARAVA) 20 MG Tab, Take 20 mg by mouth once daily., Disp: , Rfl:     levothyroxine (SYNTHROID) 75 MCG tablet, 0 Refill(s), Maintenance, Disp: , Rfl:     montelukast (SINGULAIR) 10 mg tablet, Take 1 tablet (10 mg total) by mouth every evening., Disp: 90 tablet, Rfl: 3    oxyCODONE-acetaminophen (PERCOCET) 5-325 mg per tablet, Take 1 tablet by mouth every 4 (four) hours as needed for Pain., Disp: 21 tablet, Rfl: 0    phenytoin (DILANTIN) 100 MG ER capsule, Take 100 mg by mouth., Disp: , Rfl:     rOPINIRole (REQUIP) 0.5 MG tablet, Take 1 tablet (0.5 mg total) by mouth every evening., Disp: 90 tablet, Rfl: 1    rosuvastatin (CRESTOR) 10 MG tablet, Take 1 tablet (10 mg total) by mouth once daily., Disp: 90 tablet, Rfl: 3

## 2025-07-22 ENCOUNTER — OFFICE VISIT (OUTPATIENT)
Dept: FAMILY MEDICINE | Facility: CLINIC | Age: 80
End: 2025-07-22
Payer: MEDICARE

## 2025-07-22 ENCOUNTER — TELEPHONE (OUTPATIENT)
Dept: FAMILY MEDICINE | Facility: CLINIC | Age: 80
End: 2025-07-22

## 2025-07-22 VITALS
HEIGHT: 64 IN | DIASTOLIC BLOOD PRESSURE: 86 MMHG | RESPIRATION RATE: 15 BRPM | SYSTOLIC BLOOD PRESSURE: 134 MMHG | BODY MASS INDEX: 24.33 KG/M2 | WEIGHT: 142.5 LBS | OXYGEN SATURATION: 95 % | HEART RATE: 60 BPM

## 2025-07-22 DIAGNOSIS — M79.89 LEG SWELLING: ICD-10-CM

## 2025-07-22 DIAGNOSIS — R03.0 ELEVATED BLOOD PRESSURE READING: ICD-10-CM

## 2025-07-22 DIAGNOSIS — M81.0 AGE-RELATED OSTEOPOROSIS WITHOUT CURRENT PATHOLOGICAL FRACTURE: ICD-10-CM

## 2025-07-22 DIAGNOSIS — E03.9 HYPOTHYROIDISM, UNSPECIFIED TYPE: ICD-10-CM

## 2025-07-22 DIAGNOSIS — S42.294A OTHER CLOSED NONDISPLACED FRACTURE OF PROXIMAL END OF RIGHT HUMERUS, INITIAL ENCOUNTER: Primary | ICD-10-CM

## 2025-07-22 DIAGNOSIS — G25.81 RESTLESS LEGS SYNDROME: ICD-10-CM

## 2025-07-22 PROCEDURE — 1101F PT FALLS ASSESS-DOCD LE1/YR: CPT | Mod: CPTII,S$GLB,, | Performed by: NURSE PRACTITIONER

## 2025-07-22 PROCEDURE — 1125F AMNT PAIN NOTED PAIN PRSNT: CPT | Mod: CPTII,S$GLB,, | Performed by: NURSE PRACTITIONER

## 2025-07-22 PROCEDURE — 99999 PR PBB SHADOW E&M-EST. PATIENT-LVL IV: CPT | Mod: PBBFAC,,, | Performed by: NURSE PRACTITIONER

## 2025-07-22 PROCEDURE — 3288F FALL RISK ASSESSMENT DOCD: CPT | Mod: CPTII,S$GLB,, | Performed by: NURSE PRACTITIONER

## 2025-07-22 PROCEDURE — 3079F DIAST BP 80-89 MM HG: CPT | Mod: CPTII,S$GLB,, | Performed by: NURSE PRACTITIONER

## 2025-07-22 PROCEDURE — 99214 OFFICE O/P EST MOD 30 MIN: CPT | Mod: S$GLB,,, | Performed by: NURSE PRACTITIONER

## 2025-07-22 PROCEDURE — 1159F MED LIST DOCD IN RCRD: CPT | Mod: CPTII,S$GLB,, | Performed by: NURSE PRACTITIONER

## 2025-07-22 PROCEDURE — 3075F SYST BP GE 130 - 139MM HG: CPT | Mod: CPTII,S$GLB,, | Performed by: NURSE PRACTITIONER

## 2025-07-22 RX ORDER — DORZOLAMIDE HYDROCHLORIDE AND TIMOLOL MALEATE 20; 5 MG/ML; MG/ML
1 SOLUTION/ DROPS OPHTHALMIC 2 TIMES DAILY
COMMUNITY

## 2025-07-22 RX ORDER — HYDROCHLOROTHIAZIDE 12.5 MG/1
12.5 TABLET ORAL DAILY
Qty: 90 TABLET | Refills: 1 | Status: SHIPPED | OUTPATIENT
Start: 2025-07-22

## 2025-07-22 RX ORDER — FOLIC ACID 1 MG/1
1 TABLET ORAL DAILY
COMMUNITY
Start: 2025-03-05

## 2025-07-22 RX ORDER — LEVOTHYROXINE SODIUM 75 UG/1
75 TABLET ORAL
Qty: 90 TABLET | Refills: 3 | Status: SHIPPED | OUTPATIENT
Start: 2025-07-22

## 2025-07-22 RX ORDER — EPINEPHRINE 0.3 MG/.3ML
1 INJECTION SUBCUTANEOUS ONCE
Qty: 0.3 ML | Refills: 0 | Status: SHIPPED | OUTPATIENT
Start: 2025-07-22 | End: 2025-07-22

## 2025-07-22 RX ORDER — ROPINIROLE 0.5 MG/1
0.5 TABLET, FILM COATED ORAL NIGHTLY
Qty: 90 TABLET | Refills: 1 | Status: SHIPPED | OUTPATIENT
Start: 2025-07-22

## 2025-07-22 RX ORDER — ETANERCEPT 50 MG/ML
SOLUTION SUBCUTANEOUS
COMMUNITY
Start: 2025-07-11

## 2025-07-22 RX ORDER — ALENDRONATE SODIUM 70 MG/1
70 TABLET ORAL
Qty: 12 TABLET | Refills: 3 | Status: SHIPPED | OUTPATIENT
Start: 2025-07-22

## 2025-07-22 NOTE — TELEPHONE ENCOUNTER
Copied from CRM #[id number]. Topic: Medications - Medication Refill  >> Jul 22, 2025  3:16 PM Mariaa wrote:  Type:  Needs Medical Advice    Who Called: self   Symptoms (please be specific): pt needs a refill on rx, phenytoin (DILANTIN) 100 MG ER capsule, pt sts this medication was left off.    Pt also wants to know if dr can sign a form so that she can get a handicap sticker.     Pharmacy name and phone #:      [facility] [place] AT Portal to [facility]  [place] [address]  Phone: [phone] Fax: [phone]      Would the patient rather a call back or a response via [facility]? call  Best Call Back Number: [phone]    Additional Information: please advise and thank you

## 2025-07-22 NOTE — PROGRESS NOTES
To Subjective:       Patient ID: Anitra Fermin is a 80 y.o. female.    Chief Complaint: Medication Refill and Bleeding/Bruising (Right arm bicep area)    Anitra Fermin presents to the clinic today for follow up from recent fall, medication refills.     Lived in [facility] prior moving to [facility] in 2024 to assist her sister who was on Hospice, but has since passed.  Was under the care of PCP: Yaron Contreras    Under the care of the following:  Orthopedics: Dr. Aquino  Rheumatology: Camilo Amin     Patient Active Problem List  Diagnosis  ·           Age-related osteoporosis without current pathological fracture  ·           Major depression, single episode  ·           Mixed hyperlipidemia  ·           Peripheral vertigo  ·           Restless legs syndrome  ·           Hypothyroidism  ·           Chronic interstitial lung disease  ·           Personal history of rheumatoid arthritis  ·           Anemia  ·           Situational anxiety      Presented to the ER 7/12/2025 following an accidental fall   Chief Complaint  Patient presents with  · Arm Injury      Presents with R upper arm pain after a fall approximately 30 minutes ago. The patient was kicking a cat away and lost balance, landing on the R side of the body. (-) head strike and no blood thinners noted. The patient has not taken any meds for pain. RUE skin WDL, limited ROM to RUE     Right upper arm and shoulder pain. Fell when tripped over cat. Happened prior to arrival. Has history and is being treated for RA     The history is provided by the patient.   Arm Injury   The incident occurred just prior to arrival. The incident occurred at home. Injury mechanism: tripped over cat. There is an injury to the Right shoulder and right upper arm. She reports no foreign bodies present. Pertinent negatives include no numbness, no nausea, no vomiting, no loss of consciousness and no weakness.      Medications  HYDROmorphone injection 1 mg (1 mg Intravenous Given  7/12/25 2240)  ondansetron disintegrating tablet 8 mg (8 mg Oral Given 7/12/25 2240)  morphine injection 4 mg (4 mg Intravenous Given 7/12/25 2330)  ketorolac injection 15 mg (15 mg Intravenous Given 7/12/25 2332)     Medical Decision Making  80-year-old female with known history of rheumatoid arthritis.  Comes in with right shoulder and upper arm pain.  States she fell when she tripped over a cat that got in her way.  Happened prior to arrival.        Differential diagnoses: Contusion, fracture, dislocation, rheumatoid arthritic pain     Amount and/or Complexity of Data Reviewed  Radiology: ordered. Decision-making details documented in ED Course.      Hemarthrosis without dislocation per radiology. Patient placed in sling/swathe, rest/ice, follow up with orthopedics.      Patient's evaluation in the ED does not suggest any emergent or life-threatening medical conditions requiring immediate intervention beyond what was provided in the ED, and I believe patient is safe for discharge. Regardless, an unremarkable evaluation in the ED does not preclude the development or presence of a serious or life-threatening condition. As such, patient was given return instructions for any change or worsening of symptoms.         Clinical Impression:  Final diagnoses:  [W19.XXXA] Fall  [M79.601] Right arm pain  [M25.011] Hemarthrosis of right shoulder  [S42.294A] Other closed nondisplaced fracture of proximal end of right humerus, initial encounter (Primary)       Saw Orthopedics: Dr. Aquino  7/17/2025     Plan  I have discussed medical condition and treatment options with her at length.  Begin immediate elbow range of motion exercises may begin pendulum exercises soon as she is comfortable doing so.  We have discussed sling/shoulder immobilization otherwise and follow up radiographs in 3-4 weeks sooner if any questions or problems    Has been performing her recommended exercises at home   Reports overall she is doing well  Denies  "any further stumbles, trips or falls.   Using walker to assist with ambulation.         Review of Systems    Problem List[1]    Objective:      Physical Exam  Vitals and nursing note reviewed.   Constitutional:       General: She is not in acute distress.     Appearance: Normal appearance. She is not ill-appearing.   Eyes:      Conjunctiva/sclera: Conjunctivae normal.      Comments: Corrective lenses    Cardiovascular:      Rate and Rhythm: Normal rate and regular rhythm.      Heart sounds: Normal heart sounds. No murmur heard.  Pulmonary:      Effort: Pulmonary effort is normal. No respiratory distress.      Breath sounds: No wheezing.   Musculoskeletal:      Right upper arm: Tenderness present.      Right hand: Deformity present.      Left hand: Deformity present.      Right lower leg: No edema.      Left lower leg: No edema.      Comments: Right arm in sling    Skin:     General: Skin is warm and dry.   Neurological:      Mental Status: She is alert and oriented to person, place, and time.   Psychiatric:         Mood and Affect: Mood normal.         Behavior: Behavior normal.         Lab Results   Component Value Date    WBC 6.79 11/22/2024    HGB 10.6 (L) 11/22/2024    HCT 34.5 (L) 11/22/2024     11/22/2024    CHOL 172 01/25/2024    TRIG 216 (H) 01/25/2024    HDL 50 01/25/2024    ALT 13 11/22/2024    AST 19 11/22/2024     11/22/2024    K 4.2 11/22/2024     11/22/2024    CREATININE 0.7 11/22/2024    BUN 11 11/22/2024    CO2 25 11/22/2024    TSH 3.936 11/22/2024     The ASCVD Risk score (Rockford DK, et al., 2019) failed to calculate for the following reasons:    The 2019 ASCVD risk score is only valid for ages 40 to 79  Visit Vitals  /86 (BP Location: Left arm, Patient Position: Sitting)   Pulse 60   Resp 15   Ht 5' 4" (1.626 m)   Wt 64.6 kg (142 lb 8 oz)   LMP  (LMP Unknown)   SpO2 95%   BMI 24.46 kg/m²      Assessment:       1. Other closed nondisplaced fracture of proximal end of right " humerus, initial encounter    2. Restless legs syndrome    3. Leg swelling    4. Elevated blood pressure reading    5. Age-related osteoporosis without current pathological fracture    6. Hypothyroidism, unspecified type        Plan:       1. Other closed nondisplaced fracture of proximal end of right humerus, initial encounter  Continue at home exercises as recommended by Dr. Aquino  Keep scheduled follow up appointment with Dr. Aquino for short term f/u   2. Restless legs syndrome  -     rOPINIRole (REQUIP) 0.5 MG tablet; Take 1 tablet (0.5 mg total) by mouth every evening.  Dispense: 90 tablet; Refill: 1  Stable continue current medication regimen   3. Leg swelling  -     hydroCHLOROthiazide 12.5 MG Tab; Take 1 tablet (12.5 mg total) by mouth once daily.  Dispense: 90 tablet; Refill: 1  Stable. Currently no swelling  Continue current medication regimen   4. Elevated blood pressure reading  -     hydroCHLOROthiazide 12.5 MG Tab; Take 1 tablet (12.5 mg total) by mouth once daily.  Dispense: 90 tablet; Refill: 1    5. Age-related osteoporosis without current pathological fracture  -     alendronate (FOSAMAX) 70 MG tablet; Take 1 tablet (70 mg total) by mouth every 7 days.  Dispense: 12 tablet; Refill: 3  Stable continue current medication regimen  Keep F/U next month with Rheumatology: due for DEXA   6. Hypothyroidism, unspecified type  -     levothyroxine (SYNTHROID) 75 MCG tablet; Take 1 tablet (75 mcg total) by mouth before breakfast.  Dispense: 90 tablet; Refill: 3  Stable continue current medication regimen   Other orders  -     EPINEPHrine (EPIPEN) 0.3 mg/0.3 mL AtIn; Inject 0.3 mLs (0.3 mg total) into the muscle once. for 1 dose  Dispense: 0.3 mL; Refill: 0       Follow up in about 4 months (around 11/22/2025).      Future Appointments       Date Provider Specialty Appt Notes    8/14/2025  Radiology R SHOULDER    8/14/2025 Luis Aquino MD Orthopedics TO XRAY// R SHOULDER-Other closed nondisplaced  fracture of proximal end of right humerus    11/21/2025 Liz Minor MD Family Medicine 4 month F/U                  [1]   Patient Active Problem List  Diagnosis    Age-related osteoporosis without current pathological fracture    Major depression, single episode    Mixed hyperlipidemia    Peripheral vertigo    Restless legs syndrome    Hypothyroidism    Chronic interstitial lung disease    Personal history of rheumatoid arthritis    Anemia    Situational anxiety    Nodule, rheumatoid    Seropositive erosive rheumatoid arthritis    High risk medication use    Acute bronchitis    Allergic reaction

## 2025-07-23 DIAGNOSIS — G40.909 SEIZURE DISORDER: Primary | ICD-10-CM

## 2025-07-23 DIAGNOSIS — Z51.81 MEDICATION MONITORING ENCOUNTER: ICD-10-CM

## 2025-07-23 RX ORDER — PHENYTOIN SODIUM 100 MG/1
100 CAPSULE, EXTENDED RELEASE ORAL DAILY
Qty: 90 CAPSULE | Refills: 2 | Status: SHIPPED | OUTPATIENT
Start: 2025-07-23

## 2025-07-23 RX ORDER — METHOTREXATE 2.5 MG/1
15 TABLET ORAL
COMMUNITY

## 2025-07-23 NOTE — TELEPHONE ENCOUNTER
"Returned call to patient after speak with clinical team   Handicap form, completed and ready for  at the office per Eva CARREON LPN    Dilantin 100 mg ER dx seizures  , been on the prescription for 50 years. Informed pt we would need to see the prescription bottle in order for NP to sign off on the prescription.   "The male nurse looked at the bottle yesterday"   Dilantin 100 mg ER once daily.   "

## 2025-07-23 NOTE — TELEPHONE ENCOUNTER
Copied from CRM #[id number]. Topic: Medications - Medication Refill  >> Jul 23, 2025  9:35 AM Martínez wrote:  Type:  RX Refill Request    Who Called:  PT     Refill or New Rx: REFILL    RX Name and Strength:phenytoin (DILANTIN) 100 MG ER capsule    How is the patient currently taking it? (ex. 1XDay): as directed    Is this a 30 day or 90 day RX 90    Preferred Pharmacy with phone number:[facility] [place] [facility] AT [address] [facility] [place] [address]  Phone: [phone] Fax: [phone]    Local or Mail Order:local      Ordering Provider:Garima Gonzalez    Would the patient rather a call back or a response via [facility]? Call back     Best Call Back Number:[phone]    Additional Information: Pt is completely out and this Rx wasn't included in her request for refills yesterday

## 2025-07-23 NOTE — TELEPHONE ENCOUNTER
Copied from CRM #[id number]. Topic: Medications - Medication Refill  >> Jul 22, 2025  3:16 PM Mariaa wrote:  Type:  Needs Medical Advice    Who Called: self   Symptoms (please be specific): pt needs a refill on rx, phenytoin (DILANTIN) 100 MG ER capsule, pt sts this medication was left off.    Pt also wants to know if dr can sign a form so that she can get a handicap sticker.     Pharmacy name and phone #:      [facility] [place] - [facility] AT [address] to [facility]  [place] [address]  Phone: [phone] Fax: [phone]      Would the patient rather a call back or a response via [facility]? call  Best Call Back Number: [phone]    Additional Information: please advise and thank you

## 2025-08-14 ENCOUNTER — OFFICE VISIT (OUTPATIENT)
Dept: ORTHOPEDICS | Facility: CLINIC | Age: 80
End: 2025-08-14
Payer: MEDICARE

## 2025-08-14 ENCOUNTER — HOSPITAL ENCOUNTER (OUTPATIENT)
Dept: RADIOLOGY | Facility: HOSPITAL | Age: 80
Discharge: HOME | End: 2025-08-14
Attending: ORTHOPAEDIC SURGERY
Payer: MEDICARE

## 2025-08-14 VITALS — HEIGHT: 64 IN | WEIGHT: 143.31 LBS | BODY MASS INDEX: 24.46 KG/M2

## 2025-08-14 DIAGNOSIS — S42.294A OTHER CLOSED NONDISPLACED FRACTURE OF PROXIMAL END OF RIGHT HUMERUS, INITIAL ENCOUNTER: ICD-10-CM

## 2025-08-14 DIAGNOSIS — S42.201D CLOSED FRACTURE OF PROXIMAL END OF RIGHT HUMERUS WITH ROUTINE HEALING, UNSPECIFIED FRACTURE MORPHOLOGY, SUBSEQUENT ENCOUNTER: Primary | ICD-10-CM

## 2025-08-14 PROCEDURE — 73030 X-RAY EXAM OF SHOULDER: CPT | Mod: TC,PN,RT

## 2025-08-14 PROCEDURE — 73030 X-RAY EXAM OF SHOULDER: CPT | Mod: 26,RT,, | Performed by: RADIOLOGY

## 2025-08-14 PROCEDURE — 99999 PR PBB SHADOW E&M-EST. PATIENT-LVL III: CPT | Mod: PBBFAC,,, | Performed by: ORTHOPAEDIC SURGERY
